# Patient Record
Sex: FEMALE | Race: WHITE | NOT HISPANIC OR LATINO
[De-identification: names, ages, dates, MRNs, and addresses within clinical notes are randomized per-mention and may not be internally consistent; named-entity substitution may affect disease eponyms.]

---

## 2019-08-08 ENCOUNTER — RECORD ABSTRACTING (OUTPATIENT)
Age: 54
End: 2019-08-08

## 2019-08-08 DIAGNOSIS — M94.9 DISORDER OF BONE, UNSPECIFIED: ICD-10-CM

## 2019-08-08 DIAGNOSIS — J30.2 OTHER SEASONAL ALLERGIC RHINITIS: ICD-10-CM

## 2019-08-08 DIAGNOSIS — M50.90 CERVICAL DISC DISORDER, UNSPECIFIED, UNSPECIFIED CERVICAL REGION: ICD-10-CM

## 2019-08-08 DIAGNOSIS — M89.9 DISORDER OF BONE, UNSPECIFIED: ICD-10-CM

## 2019-08-08 DIAGNOSIS — Z87.42 PERSONAL HISTORY OF OTHER DISEASES OF THE FEMALE GENITAL TRACT: ICD-10-CM

## 2019-08-08 DIAGNOSIS — Z86.2 PERSONAL HISTORY OF DISEASES OF THE BLOOD AND BLOOD-FORMING ORGANS AND CERTAIN DISORDERS INVOLVING THE IMMUNE MECHANISM: ICD-10-CM

## 2019-08-08 DIAGNOSIS — J30.9 ALLERGIC RHINITIS, UNSPECIFIED: ICD-10-CM

## 2019-08-08 DIAGNOSIS — Z87.898 PERSONAL HISTORY OF OTHER SPECIFIED CONDITIONS: ICD-10-CM

## 2019-08-08 DIAGNOSIS — K21.9 GASTRO-ESOPHAGEAL REFLUX DISEASE W/OUT ESOPHAGITIS: ICD-10-CM

## 2019-08-08 DIAGNOSIS — Z86.73 PERSONAL HISTORY OF TRANSIENT ISCHEMIC ATTACK (TIA), AND CEREBRAL INFARCTION W/OUT RESIDUAL DEFICITS: ICD-10-CM

## 2019-08-08 DIAGNOSIS — M19.90 UNSPECIFIED OSTEOARTHRITIS, UNSPECIFIED SITE: ICD-10-CM

## 2019-08-08 DIAGNOSIS — I10 ESSENTIAL (PRIMARY) HYPERTENSION: ICD-10-CM

## 2019-08-08 DIAGNOSIS — Z80.9 FAMILY HISTORY OF MALIGNANT NEOPLASM, UNSPECIFIED: ICD-10-CM

## 2019-08-08 DIAGNOSIS — E73.9 LACTOSE INTOLERANCE, UNSPECIFIED: ICD-10-CM

## 2019-08-08 DIAGNOSIS — D68.9 COAGULATION DEFECT, UNSPECIFIED: ICD-10-CM

## 2019-08-08 DIAGNOSIS — Z83.438 FAMILY HISTORY OF OTHER DISORDER OF LIPOPROTEIN METABOLISM AND OTHER LIPIDEMIA: ICD-10-CM

## 2019-08-08 DIAGNOSIS — E78.5 HYPERLIPIDEMIA, UNSPECIFIED: ICD-10-CM

## 2019-08-08 DIAGNOSIS — G40.909 EPILEPSY, UNSPECIFIED, NOT INTRACTABLE, W/OUT STATUS EPILEPTICUS: ICD-10-CM

## 2019-08-08 DIAGNOSIS — Z83.3 FAMILY HISTORY OF DIABETES MELLITUS: ICD-10-CM

## 2019-08-08 DIAGNOSIS — N99.89 OTHER POSTPROCEDURAL COMPLICATIONS AND DISORDERS OF GENITOURINARY SYSTEM: ICD-10-CM

## 2019-08-08 DIAGNOSIS — J45.909 UNSPECIFIED ASTHMA, UNCOMPLICATED: ICD-10-CM

## 2019-08-08 DIAGNOSIS — Z83.49 FAMILY HISTORY OF OTHER ENDOCRINE, NUTRITIONAL AND METABOLIC DISEASES: ICD-10-CM

## 2019-08-08 DIAGNOSIS — I69.90 UNSPECIFIED SEQUELAE OF UNSPECIFIED CEREBROVASCULAR DISEASE: ICD-10-CM

## 2019-08-08 DIAGNOSIS — Z80.0 FAMILY HISTORY OF MALIGNANT NEOPLASM OF DIGESTIVE ORGANS: ICD-10-CM

## 2019-08-08 DIAGNOSIS — Z82.49 FAMILY HISTORY OF ISCHEMIC HEART DISEASE AND OTHER DISEASES OF THE CIRCULATORY SYSTEM: ICD-10-CM

## 2019-08-08 PROBLEM — Z00.00 ENCOUNTER FOR PREVENTIVE HEALTH EXAMINATION: Status: ACTIVE | Noted: 2019-08-08

## 2019-08-08 LAB — CYTOLOGY CVX/VAG DOC THIN PREP: NORMAL

## 2019-08-08 RX ORDER — GABAPENTIN 100 MG/1
CAPSULE ORAL
Refills: 0 | Status: ACTIVE | COMMUNITY

## 2019-08-08 RX ORDER — CARBAMAZEPINE 300 MG/1
300 CAPSULE, EXTENDED RELEASE ORAL
Refills: 0 | Status: ACTIVE | COMMUNITY

## 2019-08-08 RX ORDER — ATORVASTATIN CALCIUM 40 MG/1
40 TABLET, FILM COATED ORAL
Refills: 0 | Status: ACTIVE | COMMUNITY

## 2019-08-08 RX ORDER — CETIRIZINE HYDROCHLORIDE 10 MG/1
10 TABLET, FILM COATED ORAL
Refills: 0 | Status: ACTIVE | COMMUNITY

## 2019-08-08 RX ORDER — ACETAMINOPHEN AND CODEINE PHOSPHATE 300; 30 MG/1; MG/1
300-30 TABLET ORAL
Refills: 0 | Status: ACTIVE | COMMUNITY

## 2019-08-08 RX ORDER — KETOCONAZOLE 20.5 MG/ML
2 SHAMPOO, SUSPENSION TOPICAL
Refills: 0 | Status: ACTIVE | COMMUNITY

## 2019-08-08 RX ORDER — LEVETIRACETAM 1000 MG/1
1000 TABLET, FILM COATED ORAL
Refills: 0 | Status: ACTIVE | COMMUNITY

## 2019-08-13 ENCOUNTER — APPOINTMENT (OUTPATIENT)
Dept: PAIN MANAGEMENT | Facility: CLINIC | Age: 54
End: 2019-08-13
Payer: MEDICARE

## 2019-08-13 VITALS
DIASTOLIC BLOOD PRESSURE: 70 MMHG | SYSTOLIC BLOOD PRESSURE: 100 MMHG | BODY MASS INDEX: 23.34 KG/M2 | WEIGHT: 163 LBS | HEIGHT: 70 IN

## 2019-08-13 PROCEDURE — 99214 OFFICE O/P EST MOD 30 MIN: CPT

## 2019-08-13 RX ORDER — WARFARIN 6 MG/1
6 TABLET ORAL
Refills: 0 | Status: ACTIVE | COMMUNITY

## 2019-08-13 NOTE — PHYSICAL EXAM
[Normal] : Well developed, in no acute distress, alert and oriented to person, place and time [Facet Tenderness] : facet tenderness [Spine: Flexion to ___ degrees, without pain] : spine: flexion to [unfilled] degrees, without pain [Wheelchair] : uses a wheelchair

## 2019-08-13 NOTE — HISTORY OF PRESENT ILLNESS
[FreeTextEntry1] : Interval Note:\par \par Patient was s/p L1 and L2 kyphoplasty for vertebral compression fracture and lumbar mbb/rfa with significant improvement in back pain.  She was doing well until 4 months ago she was coughing and felt intense back pain.  Since then she has had significant axial back pain, worse with transition, inabiliity to get out of her chair.  She was found to have vertebral compression fracture at L3 - now chronic without edema.  Continues to have significant back pain  Denies any additional weakness, numbness, bowel/bladder dysfunction.  Pain intensity is 10/10.  Improved with tylenol 3.\par \par \par HPI\par \par Ms. NORMA DOLAN is a 54 year F with pmhx of with history of iodinated contrast allergy with nausea and vomiting (no anaphylaxis), antiphospholipid syndrome, cva 31 years ago with left sided facial droop, left UE and LE weakness on coumadin for >5 years managed by Dr. Suh, hypertension, seizure disorder who sustained a fall in the beginning of 2/2017 and admitted to [facility] for evaluation and treatment of PNA s/p abx therapy upon discharge at [facility] in early 2/2017. As per patient she was at [facility] when she fell from her walker about 1 week ago and felt her back twist. Admitted at [facility] 2/19/17 for lowre back pain found o have acute L1 and L2 compression fracture. Patient is s/p L1 L2 kyphoplasty by me 2/20/17. Discharged to [facility] subacute rehab where patient is currently. Lower back pain has significantly improved since kyphoplasty. Patient is able to participate in PT. Denies any additional weakness, numbness or fever/chills. Incision is well healed, nonerythematous. Patient does complain of axial/mechanical bilateral lower back pain for many years without any inciting event. Nonradiating, described as aching. Patient takes tramadol 50mg BID for this pain but demonstrates desire to wean secondary to history of seizures.\par        Patient presents with  bilateral lower back pain. The pain has been occuring for many years. The pain frequency is constant with exacerbations. The character of the pain is described as aching. The current pain intensity is 7/10. With activity, the pain intensity increases to  10/10. The pain increases with activity. The pain is decreased by rest, medications. Patient reports that perfoming activities of daily living difficult. \par    History:  \par        Previous nerve block or injection \par         B/L L3-sacral ryan mbb RFA by me 5/16/17 with 100% relief of lower back pain\par         \par         Bilateral L3-sacral ryan mbb by me 3/16/17 with 90% relief of back pain for 15 hours\par         \par         Bilateral L3-sacral ryan mbb by me 4/13/17 with 50% relief of back pain for 8 hours\par         \par         L1 L2 kyphoplasty by me 2/20/17\par         . Imaging studies  \par \par MRI LS 7/2019\par \par There is a mild levoscoliosis of the lower lumbar spine. A levoscoliosis of the visualized thoracic  \par spine is noted. The lumbar lordosis and alignment is grossly maintained. The marrow signal is   \par overall within normal limits with no focal marrow replacing lesion identified.  \par  \par  There has been interval kyphoplasty of previously seen L1 and L2 compression fractures. There is   \par mild loss of height of the vertebral bodies with no significant retropulsion or underlying central   \par canal stenosis. There is additionally a mild chronic compression fracture of the superior endplate   \par which has developed in the interval. There is no associated vertebral edema. No acute fracture is   \par identified. There is mild degenerative endplate edema at L5-S1. Otherwise no abnormal vertebral or   \par paraspinal edema is appreciated. The visualized paraspinal soft tissues appear grossly unremarkable.  \par  \par  The conus medullaris terminates at L2 and the thecal sac terminates at S2. No abnormal signal is   \par identified in the visualized spinal cord.  \par  \par  L1-2: Mild disc bulge and mild ligamentous hypertrophy. No significant central canal, subarticular,  \par or foraminal stenosis. No significant interval change.  \par  \par  L2-3: Mild disc bulge and mild ligamentous hypertrophy. No significant central canal, subarticular,  \par or foraminal stenosis. Mild increase in disc bulging associated with the compression of superior   \par endplate of L3. No significant increase in stenosis.  \par  \par  L3-4: Mild disc bulge. Mild facet/ligamentous hypertrophy. No significant central canal or   \par subarticular stenosis. Mild/moderate left foraminal stenosis and no significant right foraminal   \par stenosis. No significant interval change.  \par  \par  L4-5: Minimal disc bulge and mild facet/ligamentous hypertrophy. No significant central canal or   \par subarticular stenosis. Mild bilateral foraminal stenosis. Previously seen central disc protrusion is  \par no longer appreciated. Otherwise no significant interval change.  \par  \par  L5-S1: Small left paracentral disc extrusion with inferiorly oriented extension superimposed on a   \par mild disc bulge. No significant central canal stenosis. Moderate left subarticular stenosis with   \par encroachment upon the left S1 nerve root. No significant right subarticular stenosis. Moderate right  \par foraminal stenosis and no significant left foraminal stenosis. No significant interval change.  \par  \par  \par  \par  IMPRESSION:  \par  \par  No acute fracture. Chronic compression fractures of L1, L2, and L3, with kyphoplasty cement in L1   \par and L2. Chronic L3 compression fracture has developed since 2/20/2017.  \par  \par  Lumbar spondylosis. Left paracentral disc herniation at L5-S1 encroaches upon the left S1 nerve   \par root. No significant central canal stenosis. Foraminal stenosis as above, most pronounced at L5-S1   \par the right. Overall no significant change in stenosis when compared to 2/20/2017.  \par \par         MRI LS 2/20/17\par \par There is a moderate dextroscoliosis of the lower thoracic and upper lumbar spine with apex at the approximate L1-2 level. The normal lumbar lordosis and alignment is maintained. The marrow signal is overall within normal limits with no focal marrow replacing lesion identified.\par         There is a mild compression deformity of the superior endplate of L1 with no significant retropulsion or underlying central canal stenosis. There is a mild compression deformity of the superior endplate of L2 with minimal retropulsion and no significant underlying central canal stenosis. There is edema associated with both these fractures consistent with acute to subacute fractures. In the visualized sacrum, there is edema at the S2 and S3 vertebral bodies which is nonspecific, possibly reflecting an additional sacral fracture. No definite cortical break was seen in this region on recent CT of the lumbar spine.\par         The visualized paraspinal soft tissues appear grossly unremarkable. The conus medullaris terminates at L2 and the thecal sac terminates at S1-2. No abnormal signal is identified in the visualized spinal cord.\par         T12-L1: There is no significant disc bulge or herniation. There is bilateral facet hypertrophy. There is no significant central canal, subarticular, or foraminal stenosis.\par         L1-2: There is minimal retropulsion of the superior endplate of L2. There is minimal disc bulging. There is moderate bilateral facet/ligamentous hypertrophy. There is no significant central canal, subarticular, or foraminal zone stenosis.\par         L2-3: There is no significant disc bulge or herniation. There is moderate bilateral facet/ligamentous hypertrophy. There is no significant central canal, subarticular, or foraminal zone stenosis.\par         L3-4: There is a moderate diffuse disc bulge. There is moderate bilateral facet/ligamentous hypertrophy. There is no significant central canal or subarticular zone stenosis. There is mild left foraminal stenosis and no significant right foraminal stenosis.\par         L4-5: There is a small central disc protrusion. There is moderate bilateral facet/ligamentous hypertrophy. There is no significant central canal stenosis. There is mild bilateral subarticular zone stenosis. There is mild bilateral foraminal stenosis.\par         L5-S1: There is a left paracentral disc extrusion with mild inferiorly oriented subligamentous extension superimposed on mild diffuse disc bulge. There is moderate bilateral facet hypertrophy. There is no significant central canal stenosis. There is moderate/severe left subarticular zone stenosis with encroachment upon the left S1 nerve root. There is no significant right subarticular zone stenosis. There is moderate right foraminal stenosis and no significant left foraminal stenosis.\par \par IMPRESSION:\par         Acute/subacute compression fracture of the superior endplate of L1 with no significant retropulsion or central canal stenosis. Acute/subacute compression fracture of the superior endplate of L2 with minimal retropulsion and no significant underlying central canal stenosis. Edema in the partially imaged S2 on S3 vertebra suggest an additional sacral fracture.\par         At L5-S1, a left paracentral disc herniation encroaches upon the left S1 nerve root.\par         Lumbar scoliosis and spondylosis. No significant central canal stenosis. Foraminal stenosis most pronounced at L5-S1 on the right.\par         \par \par

## 2019-09-05 ENCOUNTER — RESULT REVIEW (OUTPATIENT)
Age: 54
End: 2019-09-05

## 2019-09-05 ENCOUNTER — APPOINTMENT (OUTPATIENT)
Dept: PAIN MANAGEMENT | Facility: HOSPITAL | Age: 54
End: 2019-09-05

## 2019-09-20 ENCOUNTER — APPOINTMENT (OUTPATIENT)
Dept: PAIN MANAGEMENT | Facility: CLINIC | Age: 54
End: 2019-09-20
Payer: MEDICARE

## 2019-09-20 VITALS
HEIGHT: 70 IN | SYSTOLIC BLOOD PRESSURE: 122 MMHG | WEIGHT: 163 LBS | DIASTOLIC BLOOD PRESSURE: 60 MMHG | BODY MASS INDEX: 23.34 KG/M2

## 2019-09-20 PROCEDURE — 99214 OFFICE O/P EST MOD 30 MIN: CPT

## 2019-09-20 RX ORDER — CYANOCOBALAMIN (VITAMIN B-12) 1000 MCG
TABLET ORAL
Refills: 0 | Status: ACTIVE | COMMUNITY

## 2019-09-20 RX ORDER — GABAPENTIN 100 MG/1
100 CAPSULE ORAL
Qty: 30 | Refills: 0 | Status: ACTIVE | OUTPATIENT
Start: 2019-09-20

## 2019-09-20 NOTE — HISTORY OF PRESENT ILLNESS
[3] : 1. What number best describes your pain on average in the past week? 3/10 pain [2] : 3. What number best describes how, during the past week, pain has interfered with your general activity? 2/10 pain [FreeTextEntry1] : Interval Note:\par s/p BILATERAL L3-sacral ala diagnostic medial branch block 9/5/19 with significant improvement in bilateral lower back pain.  patient states that she is doing much better.  Mild pain in upper lumbar spine, but improved.  \par  Denies any additional weakness, numbness, bowel/bladder dysfunction.  \par \par \par HPI\par \par Ms. NORMA DOLAN is a 54 year F with pmhx of with history of iodinated contrast allergy with nausea and vomiting (no anaphylaxis), antiphospholipid syndrome, cva 31 years ago with left sided facial droop, left UE and LE weakness on coumadin for >5 years managed by Dr. Suh, hypertension, seizure disorder who sustained a fall in the beginning of 2/2017 and admitted to [facility] for evaluation and treatment of PNA s/p abx therapy upon discharge at [facility] in early 2/2017. As per patient she was at [facility] when she fell from her walker about 1 week ago and felt her back twist. Admitted at [facility] 2/19/17 for lowre back pain found o have acute L1 and L2 compression fracture. Patient is s/p L1 L2 kyphoplasty by me 2/20/17. Discharged to [facility] subacute rehab where patient is currently. Lower back pain has significantly improved since kyphoplasty. Patient is able to participate in PT. Denies any additional weakness, numbness or fever/chills. Incision is well healed, nonerythematous. Patient does complain of axial/mechanical bilateral lower back pain for many years without any inciting event. Nonradiating, described as aching. Patient takes tramadol 50mg BID for this pain but demonstrates desire to wean secondary to history of seizures.\par        Patient presents with  bilateral lower back pain. The pain has been occuring for many years. The pain frequency is constant with exacerbations. The character of the pain is described as aching. The current pain intensity is 7/10. With activity, the pain intensity increases to  10/10. The pain increases with activity. The pain is decreased by rest, medications. Patient reports that perfoming activities of daily living difficult. \par    History:  \par        Previous nerve block or injection \par \par BILATERAL L3-sacral ala diagnostic medial branch block 9/5/19\par         B/L L3-sacral ryan mbb RFA by me 5/16/17 with 100% relief of lower back pain\par         \par         Bilateral L3-sacral ryan mbb by me 3/16/17 with 90% relief of back pain for 15 hours\par         \par         Bilateral L3-sacral ryan mbb by me 4/13/17 with 50% relief of back pain for 8 hours\par         \par         L1 L2 kyphoplasty by me 2/20/17\par         . Imaging studies  \par \par MRI LS 7/2019\par \par There is a mild levoscoliosis of the lower lumbar spine. A levoscoliosis of the visualized thoracic  \par spine is noted. The lumbar lordosis and alignment is grossly maintained. The marrow signal is   \par overall within normal limits with no focal marrow replacing lesion identified.  \par  \par  There has been interval kyphoplasty of previously seen L1 and L2 compression fractures. There is   \par mild loss of height of the vertebral bodies with no significant retropulsion or underlying central   \par canal stenosis. There is additionally a mild chronic compression fracture of the superior endplate   \par which has developed in the interval. There is no associated vertebral edema. No acute fracture is   \par identified. There is mild degenerative endplate edema at L5-S1. Otherwise no abnormal vertebral or   \par paraspinal edema is appreciated. The visualized paraspinal soft tissues appear grossly unremarkable.  \par  \par  The conus medullaris terminates at L2 and the thecal sac terminates at S2. No abnormal signal is   \par identified in the visualized spinal cord.  \par  \par  L1-2: Mild disc bulge and mild ligamentous hypertrophy. No significant central canal, subarticular,  \par or foraminal stenosis. No significant interval change.  \par  \par  L2-3: Mild disc bulge and mild ligamentous hypertrophy. No significant central canal, subarticular,  \par or foraminal stenosis. Mild increase in disc bulging associated with the compression of superior   \par endplate of L3. No significant increase in stenosis.  \par  \par  L3-4: Mild disc bulge. Mild facet/ligamentous hypertrophy. No significant central canal or   \par subarticular stenosis. Mild/moderate left foraminal stenosis and no significant right foraminal   \par stenosis. No significant interval change.  \par  \par  L4-5: Minimal disc bulge and mild facet/ligamentous hypertrophy. No significant central canal or   \par subarticular stenosis. Mild bilateral foraminal stenosis. Previously seen central disc protrusion is  \par no longer appreciated. Otherwise no significant interval change.  \par  \par  L5-S1: Small left paracentral disc extrusion with inferiorly oriented extension superimposed on a   \par mild disc bulge. No significant central canal stenosis. Moderate left subarticular stenosis with   \par encroachment upon the left S1 nerve root. No significant right subarticular stenosis. Moderate right  \par foraminal stenosis and no significant left foraminal stenosis. No significant interval change.  \par  \par  \par  \par  IMPRESSION:  \par  \par  No acute fracture. Chronic compression fractures of L1, L2, and L3, with kyphoplasty cement in L1   \par and L2. Chronic L3 compression fracture has developed since 2/20/2017.  \par  \par  Lumbar spondylosis. Left paracentral disc herniation at L5-S1 encroaches upon the left S1 nerve   \par root. No significant central canal stenosis. Foraminal stenosis as above, most pronounced at L5-S1   \par the right. Overall no significant change in stenosis when compared to 2/20/2017.  \par \par         MRI LS 2/20/17\par \par There is a moderate dextroscoliosis of the lower thoracic and upper lumbar spine with apex at the approximate L1-2 level. The normal lumbar lordosis and alignment is maintained. The marrow signal is overall within normal limits with no focal marrow replacing lesion identified.\par         There is a mild compression deformity of the superior endplate of L1 with no significant retropulsion or underlying central canal stenosis. There is a mild compression deformity of the superior endplate of L2 with minimal retropulsion and no significant underlying central canal stenosis. There is edema associated with both these fractures consistent with acute to subacute fractures. In the visualized sacrum, there is edema at the S2 and S3 vertebral bodies which is nonspecific, possibly reflecting an additional sacral fracture. No definite cortical break was seen in this region on recent CT of the lumbar spine.\par         The visualized paraspinal soft tissues appear grossly unremarkable. The conus medullaris terminates at L2 and the thecal sac terminates at S1-2. No abnormal signal is identified in the visualized spinal cord.\par         T12-L1: There is no significant disc bulge or herniation. There is bilateral facet hypertrophy. There is no significant central canal, subarticular, or foraminal stenosis.\par         L1-2: There is minimal retropulsion of the superior endplate of L2. There is minimal disc bulging. There is moderate bilateral facet/ligamentous hypertrophy. There is no significant central canal, subarticular, or foraminal zone stenosis.\par         L2-3: There is no significant disc bulge or herniation. There is moderate bilateral facet/ligamentous hypertrophy. There is no significant central canal, subarticular, or foraminal zone stenosis.\par         L3-4: There is a moderate diffuse disc bulge. There is moderate bilateral facet/ligamentous hypertrophy. There is no significant central canal or subarticular zone stenosis. There is mild left foraminal stenosis and no significant right foraminal stenosis.\par         L4-5: There is a small central disc protrusion. There is moderate bilateral facet/ligamentous hypertrophy. There is no significant central canal stenosis. There is mild bilateral subarticular zone stenosis. There is mild bilateral foraminal stenosis.\par         L5-S1: There is a left paracentral disc extrusion with mild inferiorly oriented subligamentous extension superimposed on mild diffuse disc bulge. There is moderate bilateral facet hypertrophy. There is no significant central canal stenosis. There is moderate/severe left subarticular zone stenosis with encroachment upon the left S1 nerve root. There is no significant right subarticular zone stenosis. There is moderate right foraminal stenosis and no significant left foraminal stenosis.\par \par IMPRESSION:\par         Acute/subacute compression fracture of the superior endplate of L1 with no significant retropulsion or central canal stenosis. Acute/subacute compression fracture of the superior endplate of L2 with minimal retropulsion and no significant underlying central canal stenosis. Edema in the partially imaged S2 on S3 vertebra suggest an additional sacral fracture.\par         At L5-S1, a left paracentral disc herniation encroaches upon the left S1 nerve root.\par         Lumbar scoliosis and spondylosis. No significant central canal stenosis. Foraminal stenosis most pronounced at L5-S1 on the right.\par         \par \par  [FreeTextEntry2] : 7

## 2019-09-20 NOTE — ASSESSMENT
[FreeTextEntry1] : Significant component of axial back pain secondary to lumbar spondylosis and facet arthropathy demonstrated on MRI LS.  Pain refractory to conservative treatments.  s/p BILATERAL L3-sacral ala diagnostic medial branch block with significant improvement\par \par may consider rfa\par \par patient will consult with prescriber regarding holding coumadin 5 days prior to procedure, check INR day of procedure\par \par Compression fracture of lumbar vertebra, non-traumatic, sequela \par compression fracture of L1 and L2 s/p kyphoplasty with significant improvement in pain and function. \par now with chronic L3 vcf \par \par start PT - referral provided\par \par gabapentin 100mg nightly\par  \par The above diagnosis and treatment plan is medically reasonable and necessary based on the patient encounter.\par \par There were no barriers to communication.\par Informed patient that I would be available for any additional questions.\par \par Regarding opiate medication to manage pain. I had a detailed discussion with the patient regarding the risks of long-term opioid use, including the potential for medication side effects, hyperaglesia, endocrine dysfunction, addiction, tolerance, constipation, among others. Discussed relevant risks. \par  recommended discontinuation of coumadin\par \par Instructed patient to maintain pain diary to monitor pain level, mobility, and function.\par \par \par \par \par

## 2019-09-20 NOTE — PHYSICAL EXAM
[Spine: Flexion to ___ degrees, without pain] : spine: flexion to [unfilled] degrees, without pain [Wheelchair] : uses a wheelchair [Normal muscle bulk without asymmetry] : normal muscle bulk without asymmetry [UE/LE] : Motor: 5/5 motor strength in bilateral upper & lower extremities [Facet Tenderness] : no facet tenderness [Normal] : Normal affect

## 2019-10-18 ENCOUNTER — APPOINTMENT (OUTPATIENT)
Dept: PAIN MANAGEMENT | Facility: CLINIC | Age: 54
End: 2019-10-18
Payer: MEDICARE

## 2019-10-18 VITALS
HEIGHT: 70 IN | DIASTOLIC BLOOD PRESSURE: 70 MMHG | BODY MASS INDEX: 23.62 KG/M2 | SYSTOLIC BLOOD PRESSURE: 111 MMHG | WEIGHT: 165 LBS

## 2019-10-18 PROCEDURE — 99214 OFFICE O/P EST MOD 30 MIN: CPT

## 2019-10-18 NOTE — ASSESSMENT
[FreeTextEntry1] : Significant component of axial back pain secondary to lumbar spondylosis and facet arthropathy demonstrated on MRI LS.  Pain refractory to conservative treatments.  s/p BILATERAL L3-sacral ala diagnostic medial branch block with significant improvement\par \par Given significant relief from diagnostic lumbar medial branch block of >80%, and continued lumbar facet arthropathy pain and axial back pain, will schedule LEFT AND RIGHT  L3-sacral ala medial branch radiofrequency ablation at 80C for 2:30 min r/b/a discussed\par \par patient will consult with prescriber regarding holding coumadin 5 days prior to procedure, check INR day of procedure\par \par Compression fracture of lumbar vertebra, non-traumatic, sequela \par compression fracture of L1 and L2 s/p kyphoplasty with significant improvement in pain and function. \par now with chronic L3 vcf \par \par continue pT\par \par gabapentin 100mg nightly\par  \par The above diagnosis and treatment plan is medically reasonable and necessary based on the patient encounter.\par \par There were no barriers to communication.\par Informed patient that I would be available for any additional questions.\par \par Regarding opiate medication to manage pain. I had a detailed discussion with the patient regarding the risks of long-term opioid use, including the potential for medication side effects, hyperaglesia, endocrine dysfunction, addiction, tolerance, constipation, among others. Discussed relevant risks. \par  recommended discontinuation of coumadin\par \par Instructed patient to maintain pain diary to monitor pain level, mobility, and function.\par \par \par \par \par

## 2019-10-18 NOTE — HISTORY OF PRESENT ILLNESS
[3] : 1. What number best describes your pain on average in the past week? 3/10 pain [2] : 3. What number best describes how, during the past week, pain has interfered with your general activity? 2/10 pain [FreeTextEntry1] : Interval Note:\par s/p BILATERAL L3-sacral ala diagnostic medial branch block 9/5/19 with significant improvement in bilateral lower back pain.  Initially much improved with Pt but now having worsening pain over the lower back.  Pain is axial and worse with transitions.\par  Denies any additional weakness, numbness, bowel/bladder dysfunction.  \par \par \par HPI\par \par Ms. NORMA DOLAN is a 54 year F with pmhx of with history of iodinated contrast allergy with nausea and vomiting (no anaphylaxis), antiphospholipid syndrome, cva 31 years ago with left sided facial droop, left UE and LE weakness on coumadin for >5 years managed by Dr. Suh, hypertension, seizure disorder who sustained a fall in the beginning of 2/2017 and admitted to [facility] for evaluation and treatment of PNA s/p abx therapy upon discharge at [facility] in early 2/2017. As per patient she was at [place] [facility] when she fell from her walker about 1 week ago and felt her back twist. Admitted at [facility] 2/19/17 for lowre back pain found o have acute L1 and L2 compression fracture. Patient is s/p L1 L2 kyphoplasty by me 2/20/17. Discharged to [facility] subacute rehab where patient is currently. Lower back pain has significantly improved since kyphoplasty. Patient is able to participate in PT. Denies any additional weakness, numbness or fever/chills. Incision is well healed, nonerythematous. Patient does complain of axial/mechanical bilateral lower back pain for many years without any inciting event. Nonradiating, described as aching. Patient takes tramadol 50mg BID for this pain but demonstrates desire to wean secondary to history of seizures.\par        Patient presents with  bilateral lower back pain. The pain has been occuring for many years. The pain frequency is constant with exacerbations. The character of the pain is described as aching. The current pain intensity is 7/10. With activity, the pain intensity increases to  10/10. The pain increases with activity. The pain is decreased by rest, medications. Patient reports that perfoming activities of daily living difficult. \par    History:  \par        Previous nerve block or injection \par \par BILATERAL L3-sacral ala diagnostic medial branch block 9/5/19\par         B/L L3-sacral ryan mbb RFA by me 5/16/17 with 100% relief of lower back pain\par         \par         Bilateral L3-sacral ryan mbb by me 3/16/17 with 90% relief of back pain for 15 hours\par         \par         Bilateral L3-sacral ryan mbb by me 4/13/17 with 50% relief of back pain for 8 hours\par         \par         L1 L2 kyphoplasty by me 2/20/17\par         . Imaging studies  \par \par MRI LS 7/2019\par \par There is a mild levoscoliosis of the lower lumbar spine. A levoscoliosis of the visualized thoracic  \par spine is noted. The lumbar lordosis and alignment is grossly maintained. The marrow signal is   \par overall within normal limits with no focal marrow replacing lesion identified.  \par  \par  There has been interval kyphoplasty of previously seen L1 and L2 compression fractures. There is   \par mild loss of height of the vertebral bodies with no significant retropulsion or underlying central   \par canal stenosis. There is additionally a mild chronic compression fracture of the superior endplate   \par which has developed in the interval. There is no associated vertebral edema. No acute fracture is   \par identified. There is mild degenerative endplate edema at L5-S1. Otherwise no abnormal vertebral or   \par paraspinal edema is appreciated. The visualized paraspinal soft tissues appear grossly unremarkable.  \par  \par  The conus medullaris terminates at L2 and the thecal sac terminates at S2. No abnormal signal is   \par identified in the visualized spinal cord.  \par  \par  L1-2: Mild disc bulge and mild ligamentous hypertrophy. No significant central canal, subarticular,  \par or foraminal stenosis. No significant interval change.  \par  \par  L2-3: Mild disc bulge and mild ligamentous hypertrophy. No significant central canal, subarticular,  \par or foraminal stenosis. Mild increase in disc bulging associated with the compression of superior   \par endplate of L3. No significant increase in stenosis.  \par  \par  L3-4: Mild disc bulge. Mild facet/ligamentous hypertrophy. No significant central canal or   \par subarticular stenosis. Mild/moderate left foraminal stenosis and no significant right foraminal   \par stenosis. No significant interval change.  \par  \par  L4-5: Minimal disc bulge and mild facet/ligamentous hypertrophy. No significant central canal or   \par subarticular stenosis. Mild bilateral foraminal stenosis. Previously seen central disc protrusion is  \par no longer appreciated. Otherwise no significant interval change.  \par  \par  L5-S1: Small left paracentral disc extrusion with inferiorly oriented extension superimposed on a   \par mild disc bulge. No significant central canal stenosis. Moderate left subarticular stenosis with   \par encroachment upon the left S1 nerve root. No significant right subarticular stenosis. Moderate right  \par foraminal stenosis and no significant left foraminal stenosis. No significant interval change.  \par  \par  \par  \par  IMPRESSION:  \par  \par  No acute fracture. Chronic compression fractures of L1, L2, and L3, with kyphoplasty cement in L1   \par and L2. Chronic L3 compression fracture has developed since 2/20/2017.  \par  \par  Lumbar spondylosis. Left paracentral disc herniation at L5-S1 encroaches upon the left S1 nerve   \par root. No significant central canal stenosis. Foraminal stenosis as above, most pronounced at L5-S1   \par the right. Overall no significant change in stenosis when compared to 2/20/2017.  \par \par         MRI LS 2/20/17\par \par There is a moderate dextroscoliosis of the lower thoracic and upper lumbar spine with apex at the approximate L1-2 level. The normal lumbar lordosis and alignment is maintained. The marrow signal is overall within normal limits with no focal marrow replacing lesion identified.\par         There is a mild compression deformity of the superior endplate of L1 with no significant retropulsion or underlying central canal stenosis. There is a mild compression deformity of the superior endplate of L2 with minimal retropulsion and no significant underlying central canal stenosis. There is edema associated with both these fractures consistent with acute to subacute fractures. In the visualized sacrum, there is edema at the S2 and S3 vertebral bodies which is nonspecific, possibly reflecting an additional sacral fracture. No definite cortical break was seen in this region on recent CT of the lumbar spine.\par         The visualized paraspinal soft tissues appear grossly unremarkable. The conus medullaris terminates at L2 and the thecal sac terminates at S1-2. No abnormal signal is identified in the visualized spinal cord.\par         T12-L1: There is no significant disc bulge or herniation. There is bilateral facet hypertrophy. There is no significant central canal, subarticular, or foraminal stenosis.\par         L1-2: There is minimal retropulsion of the superior endplate of L2. There is minimal disc bulging. There is moderate bilateral facet/ligamentous hypertrophy. There is no significant central canal, subarticular, or foraminal zone stenosis.\par         L2-3: There is no significant disc bulge or herniation. There is moderate bilateral facet/ligamentous hypertrophy. There is no significant central canal, subarticular, or foraminal zone stenosis.\par         L3-4: There is a moderate diffuse disc bulge. There is moderate bilateral facet/ligamentous hypertrophy. There is no significant central canal or subarticular zone stenosis. There is mild left foraminal stenosis and no significant right foraminal stenosis.\par         L4-5: There is a small central disc protrusion. There is moderate bilateral facet/ligamentous hypertrophy. There is no significant central canal stenosis. There is mild bilateral subarticular zone stenosis. There is mild bilateral foraminal stenosis.\par         L5-S1: There is a left paracentral disc extrusion with mild inferiorly oriented subligamentous extension superimposed on mild diffuse disc bulge. There is moderate bilateral facet hypertrophy. There is no significant central canal stenosis. There is moderate/severe left subarticular zone stenosis with encroachment upon the left S1 nerve root. There is no significant right subarticular zone stenosis. There is moderate right foraminal stenosis and no significant left foraminal stenosis.\par \par IMPRESSION:\par         Acute/subacute compression fracture of the superior endplate of L1 with no significant retropulsion or central canal stenosis. Acute/subacute compression fracture of the superior endplate of L2 with minimal retropulsion and no significant underlying central canal stenosis. Edema in the partially imaged S2 on S3 vertebra suggest an additional sacral fracture.\par         At L5-S1, a left paracentral disc herniation encroaches upon the left S1 nerve root.\par         Lumbar scoliosis and spondylosis. No significant central canal stenosis. Foraminal stenosis most pronounced at L5-S1 on the right.\par         \par \par  [FreeTextEntry2] : 7

## 2019-10-18 NOTE — PHYSICAL EXAM
[Normal muscle bulk without asymmetry] : normal muscle bulk without asymmetry [Wheelchair] : uses a wheelchair [Spine: Flexion to ___ degrees, without pain] : spine: flexion to [unfilled] degrees, without pain [UE/LE] : Motor: 5/5 motor strength in bilateral upper & lower extremities [Normal] : Normal affect [Facet Tenderness] : facet tenderness

## 2019-11-20 ENCOUNTER — RESULT REVIEW (OUTPATIENT)
Age: 54
End: 2019-11-20

## 2019-11-20 ENCOUNTER — APPOINTMENT (OUTPATIENT)
Dept: PAIN MANAGEMENT | Facility: HOSPITAL | Age: 54
End: 2019-11-20

## 2019-12-06 ENCOUNTER — APPOINTMENT (OUTPATIENT)
Dept: PAIN MANAGEMENT | Facility: CLINIC | Age: 54
End: 2019-12-06
Payer: MEDICARE

## 2019-12-06 VITALS
DIASTOLIC BLOOD PRESSURE: 60 MMHG | BODY MASS INDEX: 23.62 KG/M2 | HEIGHT: 70 IN | SYSTOLIC BLOOD PRESSURE: 118 MMHG | WEIGHT: 165 LBS

## 2019-12-06 PROCEDURE — 99214 OFFICE O/P EST MOD 30 MIN: CPT

## 2019-12-06 NOTE — HISTORY OF PRESENT ILLNESS
[3] : 1. What number best describes your pain on average in the past week? 3/10 pain [2] : 2. What number best describes how, during the past week, pain has interfered with your enjoyment of life? 2/10 pain [FreeTextEntry1] : Interval Note:\par \par s/p LEFT AND RIGHT  L3-sacral ala medial branch radiofrequency ablation 11/20/19 with 90% improvement in back pain.  Patient complains of mild pain at night in lower back.  She utilize tylenol 3 infrequently, but pain much improved. \par  Denies any additional weakness, numbness, bowel/bladder dysfunction.  \par \par \par HPI\par \par Ms. NORMA DOLAN is a 54 year F with pmhx of with history of iodinated contrast allergy with nausea and vomiting (no anaphylaxis), antiphospholipid syndrome, cva 31 years ago with left sided facial droop, left UE and LE weakness on coumadin for >5 years managed by Dr. Suh, hypertension, seizure disorder who sustained a fall in the beginning of 2/2017 and admitted to [facility] for evaluation and treatment of PNA s/p abx therapy upon discharge at [facility] in early 2/2017. As per patient she was at [place] [facility] when she fell from her walker about 1 week ago and felt her back twist. Admitted at [facility] 2/19/17 for lowre back pain found o have acute L1 and L2 compression fracture. Patient is s/p L1 L2 kyphoplasty by me 2/20/17. Discharged to [facility] subacute rehab where patient is currently. Lower back pain has significantly improved since kyphoplasty. Patient is able to participate in PT. Denies any additional weakness, numbness or fever/chills. Incision is well healed, nonerythematous. Patient does complain of axial/mechanical bilateral lower back pain for many years without any inciting event. Nonradiating, described as aching. Patient takes tramadol 50mg BID for this pain but demonstrates desire to wean secondary to history of seizures.\par        Patient presents with  bilateral lower back pain. The pain has been occuring for many years. The pain frequency is constant with exacerbations. The character of the pain is described as aching. The current pain intensity is 7/10. With activity, the pain intensity increases to  10/10. The pain increases with activity. The pain is decreased by rest, medications. Patient reports that perfoming activities of daily living difficult. \par    History:  \par        Previous nerve block or injection \par \par LEFT AND RIGHT  L3-sacral ala medial branch radiofrequency ablation 10/20/19\par BILATERAL L3-sacral ala diagnostic medial branch block 9/5/19\par         B/L L3-sacral ryan mbb RFA by me 5/16/17 with 100% relief of lower back pain\par         \par         Bilateral L3-sacral ryan mbb by me 3/16/17 with 90% relief of back pain for 15 hours\par         \par         Bilateral L3-sacral ryan mbb by me 4/13/17 with 50% relief of back pain for 8 hours\par         \par         L1 L2 kyphoplasty by me 2/20/17\par         . Imaging studies  \par \par MRI LS 7/2019\par \par There is a mild levoscoliosis of the lower lumbar spine. A levoscoliosis of the visualized thoracic  \par spine is noted. The lumbar lordosis and alignment is grossly maintained. The marrow signal is   \par overall within normal limits with no focal marrow replacing lesion identified.  \par  \par  There has been interval kyphoplasty of previously seen L1 and L2 compression fractures. There is   \par mild loss of height of the vertebral bodies with no significant retropulsion or underlying central   \par canal stenosis. There is additionally a mild chronic compression fracture of the superior endplate   \par which has developed in the interval. There is no associated vertebral edema. No acute fracture is   \par identified. There is mild degenerative endplate edema at L5-S1. Otherwise no abnormal vertebral or   \par paraspinal edema is appreciated. The visualized paraspinal soft tissues appear grossly unremarkable.  \par  \par  The conus medullaris terminates at L2 and the thecal sac terminates at S2. No abnormal signal is   \par identified in the visualized spinal cord.  \par  \par  L1-2: Mild disc bulge and mild ligamentous hypertrophy. No significant central canal, subarticular,  \par or foraminal stenosis. No significant interval change.  \par  \par  L2-3: Mild disc bulge and mild ligamentous hypertrophy. No significant central canal, subarticular,  \par or foraminal stenosis. Mild increase in disc bulging associated with the compression of superior   \par endplate of L3. No significant increase in stenosis.  \par  \par  L3-4: Mild disc bulge. Mild facet/ligamentous hypertrophy. No significant central canal or   \par subarticular stenosis. Mild/moderate left foraminal stenosis and no significant right foraminal   \par stenosis. No significant interval change.  \par  \par  L4-5: Minimal disc bulge and mild facet/ligamentous hypertrophy. No significant central canal or   \par subarticular stenosis. Mild bilateral foraminal stenosis. Previously seen central disc protrusion is  \par no longer appreciated. Otherwise no significant interval change.  \par  \par  L5-S1: Small left paracentral disc extrusion with inferiorly oriented extension superimposed on a   \par mild disc bulge. No significant central canal stenosis. Moderate left subarticular stenosis with   \par encroachment upon the left S1 nerve root. No significant right subarticular stenosis. Moderate right  \par foraminal stenosis and no significant left foraminal stenosis. No significant interval change.  \par  \par  \par  \par  IMPRESSION:  \par  \par  No acute fracture. Chronic compression fractures of L1, L2, and L3, with kyphoplasty cement in L1   \par and L2. Chronic L3 compression fracture has developed since 2/20/2017.  \par  \par  Lumbar spondylosis. Left paracentral disc herniation at L5-S1 encroaches upon the left S1 nerve   \par root. No significant central canal stenosis. Foraminal stenosis as above, most pronounced at L5-S1   \par the right. Overall no significant change in stenosis when compared to 2/20/2017.  \par \par         MRI LS 2/20/17\par \par There is a moderate dextroscoliosis of the lower thoracic and upper lumbar spine with apex at the approximate L1-2 level. The normal lumbar lordosis and alignment is maintained. The marrow signal is overall within normal limits with no focal marrow replacing lesion identified.\par         There is a mild compression deformity of the superior endplate of L1 with no significant retropulsion or underlying central canal stenosis. There is a mild compression deformity of the superior endplate of L2 with minimal retropulsion and no significant underlying central canal stenosis. There is edema associated with both these fractures consistent with acute to subacute fractures. In the visualized sacrum, there is edema at the S2 and S3 vertebral bodies which is nonspecific, possibly reflecting an additional sacral fracture. No definite cortical break was seen in this region on recent CT of the lumbar spine.\par         The visualized paraspinal soft tissues appear grossly unremarkable. The conus medullaris terminates at L2 and the thecal sac terminates at S1-2. No abnormal signal is identified in the visualized spinal cord.\par         T12-L1: There is no significant disc bulge or herniation. There is bilateral facet hypertrophy. There is no significant central canal, subarticular, or foraminal stenosis.\par         L1-2: There is minimal retropulsion of the superior endplate of L2. There is minimal disc bulging. There is moderate bilateral facet/ligamentous hypertrophy. There is no significant central canal, subarticular, or foraminal zone stenosis.\par         L2-3: There is no significant disc bulge or herniation. There is moderate bilateral facet/ligamentous hypertrophy. There is no significant central canal, subarticular, or foraminal zone stenosis.\par         L3-4: There is a moderate diffuse disc bulge. There is moderate bilateral facet/ligamentous hypertrophy. There is no significant central canal or subarticular zone stenosis. There is mild left foraminal stenosis and no significant right foraminal stenosis.\par         L4-5: There is a small central disc protrusion. There is moderate bilateral facet/ligamentous hypertrophy. There is no significant central canal stenosis. There is mild bilateral subarticular zone stenosis. There is mild bilateral foraminal stenosis.\par         L5-S1: There is a left paracentral disc extrusion with mild inferiorly oriented subligamentous extension superimposed on mild diffuse disc bulge. There is moderate bilateral facet hypertrophy. There is no significant central canal stenosis. There is moderate/severe left subarticular zone stenosis with encroachment upon the left S1 nerve root. There is no significant right subarticular zone stenosis. There is moderate right foraminal stenosis and no significant left foraminal stenosis.\par \par IMPRESSION:\par         Acute/subacute compression fracture of the superior endplate of L1 with no significant retropulsion or central canal stenosis. Acute/subacute compression fracture of the superior endplate of L2 with minimal retropulsion and no significant underlying central canal stenosis. Edema in the partially imaged S2 on S3 vertebra suggest an additional sacral fracture.\par         At L5-S1, a left paracentral disc herniation encroaches upon the left S1 nerve root.\par         Lumbar scoliosis and spondylosis. No significant central canal stenosis. Foraminal stenosis most pronounced at L5-S1 on the right.\par         \par \par  [FreeTextEntry2] : 7

## 2019-12-06 NOTE — ASSESSMENT
[FreeTextEntry1] : Significant component of axial back pain secondary to lumbar spondylosis and facet arthropathy demonstrated on MRI LS.  Pain refractory to conservative treatments.  s/p BILATERAL L3-sacral ala diagnostic medial branch block with significant improvement\par \par Given significant relief from diagnostic lumbar medial branch block of >80%, and continued lumbar facet arthropathy pain and axial back pain, s/p LEFT AND RIGHT  L3-sacral ala medial branch radiofrequency ablation with improvement in axial back pain\par \par start PT - referral provided\par \par Compression fracture of lumbar vertebra, non-traumatic, sequela \par compression fracture of L1 and L2 s/p kyphoplasty with significant improvement in pain and function. \par now with chronic L3 vcf \par \par gabapentin 100mg nightly\par  \par The above diagnosis and treatment plan is medically reasonable and necessary based on the patient encounter.\par \par There were no barriers to communication.\par Informed patient that I would be available for any additional questions.\par \par Regarding opiate medication to manage pain. I had a detailed discussion with the patient regarding the risks of long-term opioid use, including the potential for medication side effects, hyperaglesia, endocrine dysfunction, addiction, tolerance, constipation, among others. Discussed relevant risks. \par  recommended discontinuation of coumadin\par \par Instructed patient to maintain pain diary to monitor pain level, mobility, and function.\par \par \par \par \par

## 2019-12-06 NOTE — PHYSICAL EXAM
[Normal muscle bulk without asymmetry] : normal muscle bulk without asymmetry [Spine: Flexion to ___ degrees, without pain] : spine: flexion to [unfilled] degrees, without pain [Wheelchair] : uses a wheelchair [UE/LE] : Motor: 5/5 motor strength in bilateral upper & lower extremities [Normal] : Normal affect [Facet Tenderness] : no facet tenderness

## 2020-01-10 ENCOUNTER — APPOINTMENT (OUTPATIENT)
Dept: PAIN MANAGEMENT | Facility: CLINIC | Age: 55
End: 2020-01-10
Payer: MEDICARE

## 2020-01-10 VITALS
SYSTOLIC BLOOD PRESSURE: 118 MMHG | HEIGHT: 70 IN | DIASTOLIC BLOOD PRESSURE: 70 MMHG | WEIGHT: 165 LBS | BODY MASS INDEX: 23.62 KG/M2

## 2020-01-10 PROCEDURE — 99214 OFFICE O/P EST MOD 30 MIN: CPT

## 2020-01-10 NOTE — PHYSICAL EXAM
[Normal muscle bulk without asymmetry] : normal muscle bulk without asymmetry [Wheelchair] : uses a wheelchair [Normal] : Normal affect [UE/LE] : Motor: 5/5 motor strength in bilateral upper & lower extremities [Spinous Process Tenderness] : no spinous process tenderness [Facet Tenderness] : no facet tenderness

## 2020-01-10 NOTE — ASSESSMENT
[FreeTextEntry1] : Significant component of axial back pain secondary to lumbar spondylosis and facet arthropathy demonstrated on MRI LS.  Pain refractory to conservative treatments.  s/p BILATERAL L3-sacral ala diagnostic medial branch block with significant improvement\par \par Given significant relief from diagnostic lumbar medial branch block of >80%, and continued lumbar facet arthropathy pain and axial back pain, s/p LEFT AND RIGHT  L3-sacral ala medial branch radiofrequency ablation with improvement in axial back pain\par \par continue PT\par \par Compression fracture of lumbar vertebra, non-traumatic, sequela \par compression fracture of L1 and L2 s/p kyphoplasty with significant improvement in pain and function. \par now with chronic L3 vcf \par \par referral to endocrinology for eval and treatment of osteoporosis\par gabapentin 100mg nightly\par  \par The above diagnosis and treatment plan is medically reasonable and necessary based on the patient encounter.\par \par There were no barriers to communication.\par Informed patient that I would be available for any additional questions.\par \par \par Instructed patient to maintain pain diary to monitor pain level, mobility, and function.\par \par \par \par \par

## 2020-01-10 NOTE — HISTORY OF PRESENT ILLNESS
[2] : 2. What number best describes how, during the past week, pain has interfered with your enjoyment of life? 2/10 pain [FreeTextEntry2] : 6 [FreeTextEntry1] : Interval Note:\par \par s/p LEFT AND RIGHT  L3-sacral ala medial branch radiofrequency ablation 11/20/19 with 90% improvement in back pain.  Patient complains of mild pain at night in lower back, improved with lidocaine patch.  Patient states that she has improvement in ROM as well and is very happy with her relief.\par  Denies any additional weakness, numbness, bowel/bladder dysfunction.  \par \par \par HPI\par \par Ms. NORMA DOLAN is a 54 year F with pmhx of with history of iodinated contrast allergy with nausea and vomiting (no anaphylaxis), antiphospholipid syndrome, cva 31 years ago with left sided facial droop, left UE and LE weakness on coumadin for >5 years managed by Dr. Suh, hypertension, seizure disorder who sustained a fall in the beginning of 2/2017 and admitted to [facility] for evaluation and treatment of PNA s/p abx therapy upon discharge at [facility] in early 2/2017. As per patient she was at [place] [facility] when she fell from her walker about 1 week ago and felt her back twist. Admitted at [facility] 2/19/17 for lowre back pain found o have acute L1 and L2 compression fracture. Patient is s/p L1 L2 kyphoplasty by me 2/20/17. Discharged to [facility] subacute rehab where patient is currently. Lower back pain has significantly improved since kyphoplasty. Patient is able to participate in PT. Denies any additional weakness, numbness or fever/chills. Incision is well healed, nonerythematous. Patient does complain of axial/mechanical bilateral lower back pain for many years without any inciting event. Nonradiating, described as aching. Patient takes tramadol 50mg BID for this pain but demonstrates desire to wean secondary to history of seizures.\par        Patient presents with  bilateral lower back pain. The pain has been occuring for many years. The pain frequency is constant with exacerbations. The character of the pain is described as aching. The current pain intensity is 7/10. With activity, the pain intensity increases to  10/10. The pain increases with activity. The pain is decreased by rest, medications. Patient reports that perfoming activities of daily living difficult. \par    History:  \par        Previous nerve block or injection \par \par LEFT AND RIGHT  L3-sacral ala medial branch radiofrequency ablation 10/20/19\par BILATERAL L3-sacral ala diagnostic medial branch block 9/5/19\par         B/L L3-sacral ryan mbb RFA by me 5/16/17 with 100% relief of lower back pain\par         \par         Bilateral L3-sacral ryan mbb by me 3/16/17 with 90% relief of back pain for 15 hours\par         \par         Bilateral L3-sacral ryan mbb by me 4/13/17 with 50% relief of back pain for 8 hours\par         \par         L1 L2 kyphoplasty by me 2/20/17\par         . Imaging studies  \par \par MRI LS 7/2019\par \par There is a mild levoscoliosis of the lower lumbar spine. A levoscoliosis of the visualized thoracic  \par spine is noted. The lumbar lordosis and alignment is grossly maintained. The marrow signal is   \par overall within normal limits with no focal marrow replacing lesion identified.  \par  \par  There has been interval kyphoplasty of previously seen L1 and L2 compression fractures. There is   \par mild loss of height of the vertebral bodies with no significant retropulsion or underlying central   \par canal stenosis. There is additionally a mild chronic compression fracture of the superior endplate   \par which has developed in the interval. There is no associated vertebral edema. No acute fracture is   \par identified. There is mild degenerative endplate edema at L5-S1. Otherwise no abnormal vertebral or   \par paraspinal edema is appreciated. The visualized paraspinal soft tissues appear grossly unremarkable.  \par  \par  The conus medullaris terminates at L2 and the thecal sac terminates at S2. No abnormal signal is   \par identified in the visualized spinal cord.  \par  \par  L1-2: Mild disc bulge and mild ligamentous hypertrophy. No significant central canal, subarticular,  \par or foraminal stenosis. No significant interval change.  \par  \par  L2-3: Mild disc bulge and mild ligamentous hypertrophy. No significant central canal, subarticular,  \par or foraminal stenosis. Mild increase in disc bulging associated with the compression of superior   \par endplate of L3. No significant increase in stenosis.  \par  \par  L3-4: Mild disc bulge. Mild facet/ligamentous hypertrophy. No significant central canal or   \par subarticular stenosis. Mild/moderate left foraminal stenosis and no significant right foraminal   \par stenosis. No significant interval change.  \par  \par  L4-5: Minimal disc bulge and mild facet/ligamentous hypertrophy. No significant central canal or   \par subarticular stenosis. Mild bilateral foraminal stenosis. Previously seen central disc protrusion is  \par no longer appreciated. Otherwise no significant interval change.  \par  \par  L5-S1: Small left paracentral disc extrusion with inferiorly oriented extension superimposed on a   \par mild disc bulge. No significant central canal stenosis. Moderate left subarticular stenosis with   \par encroachment upon the left S1 nerve root. No significant right subarticular stenosis. Moderate right  \par foraminal stenosis and no significant left foraminal stenosis. No significant interval change.  \par  \par  \par  \par  IMPRESSION:  \par  \par  No acute fracture. Chronic compression fractures of L1, L2, and L3, with kyphoplasty cement in L1   \par and L2. Chronic L3 compression fracture has developed since 2/20/2017.  \par  \par  Lumbar spondylosis. Left paracentral disc herniation at L5-S1 encroaches upon the left S1 nerve   \par root. No significant central canal stenosis. Foraminal stenosis as above, most pronounced at L5-S1   \par the right. Overall no significant change in stenosis when compared to 2/20/2017.  \par \par         MRI LS 2/20/17\par \par There is a moderate dextroscoliosis of the lower thoracic and upper lumbar spine with apex at the approximate L1-2 level. The normal lumbar lordosis and alignment is maintained. The marrow signal is overall within normal limits with no focal marrow replacing lesion identified.\par         There is a mild compression deformity of the superior endplate of L1 with no significant retropulsion or underlying central canal stenosis. There is a mild compression deformity of the superior endplate of L2 with minimal retropulsion and no significant underlying central canal stenosis. There is edema associated with both these fractures consistent with acute to subacute fractures. In the visualized sacrum, there is edema at the S2 and S3 vertebral bodies which is nonspecific, possibly reflecting an additional sacral fracture. No definite cortical break was seen in this region on recent CT of the lumbar spine.\par         The visualized paraspinal soft tissues appear grossly unremarkable. The conus medullaris terminates at L2 and the thecal sac terminates at S1-2. No abnormal signal is identified in the visualized spinal cord.\par         T12-L1: There is no significant disc bulge or herniation. There is bilateral facet hypertrophy. There is no significant central canal, subarticular, or foraminal stenosis.\par         L1-2: There is minimal retropulsion of the superior endplate of L2. There is minimal disc bulging. There is moderate bilateral facet/ligamentous hypertrophy. There is no significant central canal, subarticular, or foraminal zone stenosis.\par         L2-3: There is no significant disc bulge or herniation. There is moderate bilateral facet/ligamentous hypertrophy. There is no significant central canal, subarticular, or foraminal zone stenosis.\par         L3-4: There is a moderate diffuse disc bulge. There is moderate bilateral facet/ligamentous hypertrophy. There is no significant central canal or subarticular zone stenosis. There is mild left foraminal stenosis and no significant right foraminal stenosis.\par         L4-5: There is a small central disc protrusion. There is moderate bilateral facet/ligamentous hypertrophy. There is no significant central canal stenosis. There is mild bilateral subarticular zone stenosis. There is mild bilateral foraminal stenosis.\par         L5-S1: There is a left paracentral disc extrusion with mild inferiorly oriented subligamentous extension superimposed on mild diffuse disc bulge. There is moderate bilateral facet hypertrophy. There is no significant central canal stenosis. There is moderate/severe left subarticular zone stenosis with encroachment upon the left S1 nerve root. There is no significant right subarticular zone stenosis. There is moderate right foraminal stenosis and no significant left foraminal stenosis.\par \par IMPRESSION:\par         Acute/subacute compression fracture of the superior endplate of L1 with no significant retropulsion or central canal stenosis. Acute/subacute compression fracture of the superior endplate of L2 with minimal retropulsion and no significant underlying central canal stenosis. Edema in the partially imaged S2 on S3 vertebra suggest an additional sacral fracture.\par         At L5-S1, a left paracentral disc herniation encroaches upon the left S1 nerve root.\par         Lumbar scoliosis and spondylosis. No significant central canal stenosis. Foraminal stenosis most pronounced at L5-S1 on the right.\par         \par \par

## 2020-04-10 ENCOUNTER — APPOINTMENT (OUTPATIENT)
Dept: PAIN MANAGEMENT | Facility: CLINIC | Age: 55
End: 2020-04-10

## 2021-12-22 ENCOUNTER — APPOINTMENT (OUTPATIENT)
Dept: PAIN MANAGEMENT | Facility: CLINIC | Age: 56
End: 2021-12-22
Payer: MEDICARE

## 2021-12-22 VITALS
SYSTOLIC BLOOD PRESSURE: 124 MMHG | WEIGHT: 165 LBS | HEIGHT: 70 IN | TEMPERATURE: 98 F | BODY MASS INDEX: 23.62 KG/M2 | DIASTOLIC BLOOD PRESSURE: 84 MMHG

## 2021-12-22 PROCEDURE — 99214 OFFICE O/P EST MOD 30 MIN: CPT

## 2021-12-22 NOTE — PHYSICAL EXAM
[Normal muscle bulk without asymmetry] : normal muscle bulk without asymmetry [Facet Tenderness] : facet tenderness [Spine: Flexion to ___ degrees, without pain] : spine: flexion to [unfilled] degrees, without pain [Wheelchair] : uses a wheelchair [Normal] : Normal affect

## 2021-12-22 NOTE — HISTORY OF PRESENT ILLNESS
[2] : 3. What number best describes how, during the past week, pain has interfered with your general activity? 2/10 pain [FreeTextEntry1] : Interval Note:\par \par Patient reports that she has bilateral lower back pain. Patient reports that she coughed a month ago and reported mid back pain.  Found to have T9 compression fracture on XR.  Unknown acuity.  Patient requires tramadol for pain.  Pain is so bad that patient finds it difficult to perform adls and ambulate. \par \par  Denies any additional weakness, numbness, bowel/bladder dysfunction.  \par \par \par HPI\par \par Ms. NORMA DOLAN is a 54 year F with pmhx of with history of iodinated contrast allergy with nausea and vomiting (no anaphylaxis), antiphospholipid syndrome, cva 31 years ago with left sided facial droop, left UE and LE weakness on coumadin for >5 years managed by Dr. Suh, hypertension, seizure disorder who sustained a fall in the beginning of 2/2017 and admitted to [facility] for evaluation and treatment of PNA s/p abx therapy upon discharge at [facility] in early 2/2017. As per patient she was at [place] [facility] when she fell from her walker about 1 week ago and felt her back twist. Admitted at [facility] 2/19/17 for lowre back pain found o have acute L1 and L2 compression fracture. Patient is s/p L1 L2 kyphoplasty by me 2/20/17. Discharged to [facility] subacute rehab where patient is currently. Lower back pain has significantly improved since kyphoplasty. Patient is able to participate in PT. Denies any additional weakness, numbness or fever/chills. Incision is well healed, nonerythematous. Patient does complain of axial/mechanical bilateral lower back pain for many years without any inciting event. Nonradiating, described as aching. Patient takes tramadol 50mg BID for this pain but demonstrates desire to wean secondary to history of seizures.\par        Patient presents with  bilateral lower back pain. The pain has been occuring for many years. The pain frequency is constant with exacerbations. The character of the pain is described as aching. The current pain intensity is 7/10. With activity, the pain intensity increases to  10/10. The pain increases with activity. The pain is decreased by rest, medications. Patient reports that perfoming activities of daily living difficult. \par    History:  \par        Previous nerve block or injection \par \par LEFT AND RIGHT  L3-sacral ala medial branch radiofrequency ablation 10/20/19\par BILATERAL L3-sacral ala diagnostic medial branch block 9/5/19\par         B/L L3-sacral ryan mbb RFA by me 5/16/17 with 100% relief of lower back pain\par         \par         Bilateral L3-sacral ryan mbb by me 3/16/17 with 90% relief of back pain for 15 hours\par         \par         Bilateral L3-sacral ryan mbb by me 4/13/17 with 50% relief of back pain for 8 hours\par         \par         L1 L2 kyphoplasty by me 2/20/17\par         . Imaging studies  \par \par MRI LS 7/2019\par \par There is a mild levoscoliosis of the lower lumbar spine. A levoscoliosis of the visualized thoracic  \par spine is noted. The lumbar lordosis and alignment is grossly maintained. The marrow signal is   \par overall within normal limits with no focal marrow replacing lesion identified.  \par  \par  There has been interval kyphoplasty of previously seen L1 and L2 compression fractures. There is   \par mild loss of height of the vertebral bodies with no significant retropulsion or underlying central   \par canal stenosis. There is additionally a mild chronic compression fracture of the superior endplate   \par which has developed in the interval. There is no associated vertebral edema. No acute fracture is   \par identified. There is mild degenerative endplate edema at L5-S1. Otherwise no abnormal vertebral or   \par paraspinal edema is appreciated. The visualized paraspinal soft tissues appear grossly unremarkable.  \par  \par  The conus medullaris terminates at L2 and the thecal sac terminates at S2. No abnormal signal is   \par identified in the visualized spinal cord.  \par  \par  L1-2: Mild disc bulge and mild ligamentous hypertrophy. No significant central canal, subarticular,  \par or foraminal stenosis. No significant interval change.  \par  \par  L2-3: Mild disc bulge and mild ligamentous hypertrophy. No significant central canal, subarticular,  \par or foraminal stenosis. Mild increase in disc bulging associated with the compression of superior   \par endplate of L3. No significant increase in stenosis.  \par  \par  L3-4: Mild disc bulge. Mild facet/ligamentous hypertrophy. No significant central canal or   \par subarticular stenosis. Mild/moderate left foraminal stenosis and no significant right foraminal   \par stenosis. No significant interval change.  \par  \par  L4-5: Minimal disc bulge and mild facet/ligamentous hypertrophy. No significant central canal or   \par subarticular stenosis. Mild bilateral foraminal stenosis. Previously seen central disc protrusion is  \par no longer appreciated. Otherwise no significant interval change.  \par  \par  L5-S1: Small left paracentral disc extrusion with inferiorly oriented extension superimposed on a   \par mild disc bulge. No significant central canal stenosis. Moderate left subarticular stenosis with   \par encroachment upon the left S1 nerve root. No significant right subarticular stenosis. Moderate right  \par foraminal stenosis and no significant left foraminal stenosis. No significant interval change.  \par  \par  \par  \par  IMPRESSION:  \par  \par  No acute fracture. Chronic compression fractures of L1, L2, and L3, with kyphoplasty cement in L1   \par and L2. Chronic L3 compression fracture has developed since 2/20/2017.  \par  \par  Lumbar spondylosis. Left paracentral disc herniation at L5-S1 encroaches upon the left S1 nerve   \par root. No significant central canal stenosis. Foraminal stenosis as above, most pronounced at L5-S1   \par the right. Overall no significant change in stenosis when compared to 2/20/2017.  \par \par         MRI LS 2/20/17\par \par There is a moderate dextroscoliosis of the lower thoracic and upper lumbar spine with apex at the approximate L1-2 level. The normal lumbar lordosis and alignment is maintained. The marrow signal is overall within normal limits with no focal marrow replacing lesion identified.\par         There is a mild compression deformity of the superior endplate of L1 with no significant retropulsion or underlying central canal stenosis. There is a mild compression deformity of the superior endplate of L2 with minimal retropulsion and no significant underlying central canal stenosis. There is edema associated with both these fractures consistent with acute to subacute fractures. In the visualized sacrum, there is edema at the S2 and S3 vertebral bodies which is nonspecific, possibly reflecting an additional sacral fracture. No definite cortical break was seen in this region on recent CT of the lumbar spine.\par         The visualized paraspinal soft tissues appear grossly unremarkable. The conus medullaris terminates at L2 and the thecal sac terminates at S1-2. No abnormal signal is identified in the visualized spinal cord.\par         T12-L1: There is no significant disc bulge or herniation. There is bilateral facet hypertrophy. There is no significant central canal, subarticular, or foraminal stenosis.\par         L1-2: There is minimal retropulsion of the superior endplate of L2. There is minimal disc bulging. There is moderate bilateral facet/ligamentous hypertrophy. There is no significant central canal, subarticular, or foraminal zone stenosis.\par         L2-3: There is no significant disc bulge or herniation. There is moderate bilateral facet/ligamentous hypertrophy. There is no significant central canal, subarticular, or foraminal zone stenosis.\par         L3-4: There is a moderate diffuse disc bulge. There is moderate bilateral facet/ligamentous hypertrophy. There is no significant central canal or subarticular zone stenosis. There is mild left foraminal stenosis and no significant right foraminal stenosis.\par         L4-5: There is a small central disc protrusion. There is moderate bilateral facet/ligamentous hypertrophy. There is no significant central canal stenosis. There is mild bilateral subarticular zone stenosis. There is mild bilateral foraminal stenosis.\par         L5-S1: There is a left paracentral disc extrusion with mild inferiorly oriented subligamentous extension superimposed on mild diffuse disc bulge. There is moderate bilateral facet hypertrophy. There is no significant central canal stenosis. There is moderate/severe left subarticular zone stenosis with encroachment upon the left S1 nerve root. There is no significant right subarticular zone stenosis. There is moderate right foraminal stenosis and no significant left foraminal stenosis.\par \par IMPRESSION:\par         Acute/subacute compression fracture of the superior endplate of L1 with no significant retropulsion or central canal stenosis. Acute/subacute compression fracture of the superior endplate of L2 with minimal retropulsion and no significant underlying central canal stenosis. Edema in the partially imaged S2 on S3 vertebra suggest an additional sacral fracture.\par         At L5-S1, a left paracentral disc herniation encroaches upon the left S1 nerve root.\par         Lumbar scoliosis and spondylosis. No significant central canal stenosis. Foraminal stenosis most pronounced at L5-S1 on the right.\par         \par \par  [FreeTextEntry2] : 6

## 2021-12-22 NOTE — ASSESSMENT
[FreeTextEntry1] : >> Imaging and Other Studies\par \par back and leg pain likely secondary to lumbar radiculopathy and discogenic pain refractory to conservative treatments including 6 consecutive weeks of home exercises/PT, will obtain MRI LS to evaluate for pathology\par \par may consider PT vs intervention pending eval\par \par obtain MRI TS to evaluate for pathology to evaluate acuity of thoracic vcf\par \par >> Therapy and Other Modalities\par \par hold PT\par \par >> Medications\par  \par continue PT\par \par Compression fracture of lumbar vertebra, non-traumatic, sequela \par compression fracture of L1 and L2 s/p kyphoplasty with significant improvement in pain and function. \par now with chronic L3 vcf \par \par gabapentin 100mg nightly\par acetaminophen 650mg q8h prn pain (caution <3g daily)\par \par Regarding opiate medication to manage pain. I had a detailed discussion with the patient regarding the risks of long-term opioid use, including the potential for medication side effects, hyperaglesia, endocrine dysfunction,  Encouraged weaning with assistance of current prescriber.\par minimize tramadol use\par \par >> Interventions\par \par Significant component of axial back pain secondary to lumbar spondylosis and facet arthropathy demonstrated on MRI LS.  Pain refractory to conservative treatments.  s/p BILATERAL L3-sacral ala diagnostic medial branch block with significant improvement\par \par Given significant relief from diagnostic lumbar medial branch block of >80%, and continued lumbar facet arthropathy pain and axial back pain, s/p LEFT AND RIGHT  L3-sacral ala medial branch radiofrequency ablation with improvement in axial back pain\par \par may consider repeat mb rfa\par \par >> Consults\par \par continue care with endocrinologist for osteoporosis\par \par >> Discussion of Risks/Benefits/Alternatives\par \par 	>Regarding any scheduled procedures:\par \par I have discussed in detail with the patient that any interventional pain procedure is associated with potential risks.  The procedure may include an injection of steroids and potentially other medications (local anesthetic and normal saline) into the epidural space or surrounding tissue of the spine.  There are significant risks of this procedure which include and are not limited to infection, bleeding, worsening pain, dural puncture leading to postdural puncture headache, nerve damage, spinal cord injury, paralysis, stroke, and death.  \par \par There is a chance that the procedure does not improve their pain.  \par \par There are risks associated with the steroid being absorbed into the body systemically.  These include dysphoria, difficulty sleeping, mood swings and personality changes.  Premenopausal women may notice an irregularity in her menstrual cycle for 2-3 months following the injection.  Steroids can specifically affect patients with hypertension, diabetes, and peptic ulcers.  The procedure may cause a temporary increase in blood pressure and blood pressure, and may adversely affect a peptic ulcer.  Other, more rare complications, include avascular necrosis of joints, glaucoma and worsening of osteoporosis. \par \par I have discussed the risks of the procedure at length with the patient, and the potential benefits of pain relief.  I have offered alternatives to the procedure.  All questions were answered.  \par \par The patient expressed understanding and wishes to proceed with the procedure.\par \par 	>Regarding COVID19 Pandemic: \par \par Any planned interventional pain procedure are scheduled because further delay may cause harm or negative outcome to patient.  The goal in performing this procedure is to avoid deterioration of function, emergency room visits (which increases exposure) and reliance on opioids.  \par \par r/b/a discussed with patient, lack of evidence to conclusively determine whether pain management procedures have any positive or negative impact on the possibility of tracey the virus and/or development of any sequelae. \par \par Patient counselled regarding timing steroid based intervention 2 weeks before or after COVID-19 vaccine administration to avoid any interaction or affect on efficacy of vaccination\par \par Patient demonstrates understanding\par \par Informed patient that risks associated with the COVID-19 infection.  Informed patient steps taken to limit the risks.  We are implementing safety precautions and following protocols consistent with the CDC and state recommendations. All patients and staff will be checked for fever or signs of illness upon entry to the facility. We will limit our steroid dose to the lowest effective therapeutic dose or in some cases steroids will not be injected at all. \par \par Patient agrees to proceed\par \par >> Conclusion\par \par The above diagnosis and treatment plan is medically reasonable and necessary based on the patient encounter \par There were no barriers to communication.\par Informed patient that I would be available for any additional questions.\par Patient was instructed to call with any worsening symptoms including severe pain, new numbness/weakness, or changes in the bowel/bladder function. \par Discussed role of nsaids in pain management and all relevant risks, if patient is continuing to require after 4 weeks the patient should f/u for alternative treatment. \par Instructed patient to maintain pain diary to monitor pain level, mobility, and function.\par \par \par \par \par \par

## 2022-01-12 ENCOUNTER — RESULT REVIEW (OUTPATIENT)
Age: 57
End: 2022-01-12

## 2022-02-11 ENCOUNTER — APPOINTMENT (OUTPATIENT)
Dept: PAIN MANAGEMENT | Facility: CLINIC | Age: 57
End: 2022-02-11
Payer: MEDICARE

## 2022-02-11 VITALS
WEIGHT: 153 LBS | DIASTOLIC BLOOD PRESSURE: 79 MMHG | BODY MASS INDEX: 21.9 KG/M2 | SYSTOLIC BLOOD PRESSURE: 129 MMHG | HEIGHT: 70 IN | TEMPERATURE: 98 F

## 2022-02-11 PROCEDURE — 99214 OFFICE O/P EST MOD 30 MIN: CPT

## 2022-02-11 RX ORDER — OXYCODONE AND ACETAMINOPHEN 5; 325 MG/1; MG/1
5-325 TABLET ORAL
Refills: 0 | Status: ACTIVE | COMMUNITY

## 2022-02-11 NOTE — HISTORY OF PRESENT ILLNESS
[2] : 3. What number best describes how, during the past week, pain has interfered with your general activity? 2/10 pain [10] : a maximum pain level of 10/10 [FreeTextEntry1] : Interval Note:\par \par  Patient reports that she coughed a month ago and reported mid back pain.  Found to have T9 compression fracture on XR.   Pain is so bad that patient finds it difficult to perform adls and ambulate. Patient reports that she has worsening mid back pain now requiring percocet. \par \par  Denies any additional weakness, numbness, bowel/bladder dysfunction.  \par \par \par HPI\par \par Ms. 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 Suh, hypertension, seizure disorder who sustained a fall in the beginning of 2/2017 and admitted to [facility] for evaluation and treatment of PNA s/p abx therapy upon discharge at [facility] in early 2/2017. As per patient she was at [facility] when she fell from her walker about 1 week ago and felt her back twist. Admitted at [facility] 2/19/17 for lowre back pain found o have acute L1 and L2 compression fracture. Patient is s/p L1 L2 kyphoplasty by me 2/20/17. Discharged to [facility] subacute rehab where patient is currently. Lower back pain has significantly improved since kyphoplasty. Patient is able to participate in PT. Denies any additional weakness, numbness or fever/chills. Incision is well healed, nonerythematous. Patient does complain of axial/mechanical bilateral lower back pain for many years without any inciting event. Nonradiating, described as aching. Patient takes tramadol 50mg BID for this pain but demonstrates desire to wean secondary to history of seizures.\par        Patient presents with  bilateral lower back pain. The pain has been occuring for many years. The pain frequency is constant with exacerbations. The character of the pain is described as aching. The current pain intensity is 7/10. With activity, the pain intensity increases to  10/10. The pain increases with activity. The pain is decreased by rest, medications. Patient reports that perfoming activities of daily living difficult. \par    History:  \par        Previous nerve block or injection \par \par LEFT AND RIGHT  L3-sacral ala medial branch radiofrequency ablation 10/20/19\par BILATERAL L3-sacral ala diagnostic medial branch block 9/5/19\par         B/L L3-sacral ryan mbb RFA by me 5/16/17 with 100% relief of lower back pain\par         \par         Bilateral L3-sacral ryan mbb by me 3/16/17 with 90% relief of back pain for 15 hours\par         \par         Bilateral L3-sacral ryan mbb by me 4/13/17 with 50% relief of back pain for 8 hours\par         \par         L1 L2 kyphoplasty by me 2/20/17\par \par Imaging studies  \par \par MRI LS 1/22\par \par Comparison is made to a prior MRI of the lumbar spine performed on 7/9/2019.\par \par  There is normal curvature to the lumbar lordosis. There is mild dextroscoliosis of the lower\par thoracic and upper lumbar spine with apex at L1/L2, unchanged. There are mild chronic compression\par fractures of the L1, L2 and L3 vertebral bodies. The patient is status post kyphoplasty of L1 and\par L2. The remaining vertebral bodies are normal height and configuration. The intervertebral disc\par demonstrate moderate loss of disc height and T2 signal at the L3/L4 and L5/S1 and mild loss at L4/L5\par disc spaces with anterior and minimal posterior osteophyte formation consistent with desiccation and\par degenerative changes. The conus terminates at the L1 level and demonstrates no evidence of abnormal\par signal changes.\par \par  Evaluation of the individual levels demonstrates at the L5/S1 level there is a mild diffuse disc\par bulge and right-sided osteophyte mildly compressing the distal right L5 foraminal exiting nerve\par root. There is a central/left paracentral protrusion flattening the ventral thecal sac and minimally\par contacting the right descending S1 nerve root and compressing and mildly posteriorly displacing the\par left descending S1 nerve root, unchanged. There is moderate to severe right and moderate left\par foraminal narrowing. There is moderate facet and ligamentous hypertrophy. There is left lateral\par recess narrowing, unchanged. There is no evidence of spinal canal stenosis.\par \par  At the L4/L5 level there is a minimal disc bulge contacting the ventral thecal sac, unchanged.\par There is mild bilateral foraminal narrowing. There is mild to moderate facet and ligamentous\par hypertrophy. There is no evidence of spinal canal stenosis.\par \par  At the L3/L4 level there is a mild diffuse disc bulge flattening the ventral thecal sac, unchanged.\par There is minimal right and mild left foraminal narrowing. There is mild facet and ligamentous\par hypertrophy. There is no evidence of spinal canal stenosis.\par \par  At the L2/L3 level there is a minimal diffuse disc bulge contacting the ventral thecal sac,\par unchanged. The bilateral neuroforamen are patent. There is mild facet degenerative changes. There is\par no evidence of spinal canal stenosis.\par \par  At the L1/L2 and T12/L1 levels there are no evidence of focal disc herniation or spinal canal\par stenosis.\par \par \par  Impression:\par \par  Mild chronic compression fractures of the L1, L2 and L3 vertebral bodies, unchanged. Status post\par post kyphoplasty of L1 and L2, unchanged.\par \par  Degenerative changes of the lumbar spine was notable at L5/S1\par \par  L5/S1  mild diffuse disc bulge and right-sided osteophyte mildly compressing the distal right L5\par foraminal exiting nerve root, unchanged. There is a central/left paracentral protrusion flattening\par the ventral thecal sac and minimally contacting the right descending S1 nerve root and compressing\par and mildly posteriorly displacing the left descending S1 nerve root, unchanged. L5/S1 no evidence of\par spinal canal stenosis.\par \par MRI TS 1/22\par \par  MRI of the thoracic spine was performed utilizing sagittal  T1, axial and sagittal T2-\par weighted and axial gradient echo images as well as sagittal STIR images.\par \par  There are no prior studies available for comparison.\par \par  Findings: There is moderate loss of height of the T8 vertebral body with a small band of STIR\par signal hyperintensity seen at the superior endplate; findings are consistent with an acute to\par subacute moderate T8 compression fracture. There is no evidence of retropulsion of fracture\par fragments.\par \par  There is kyphosis of the thoracic spine. There is a Schmorl's node seen at the superior endplate of\par T2 inferior endplate of T8. The remaining vertebral bodies are of normal height and configuration.\par The intervertebral discs spaces demonstrate mild loss of disc height and T2 signal within the T1/T2,\par T2/T3, T5/T6, T6/T7, T7/T8 and T8/T9 consistent with desiccation. There is an approximately 0.6 cm\par focus of T1, T2 and STIR signal marked hypointensity that may represent a small bone island at the\par superior endplate of T9. Evaluation of the spinal cord demonstrates no evidence of abnormal signal\par changes.\par \par  Evaluation of the individual levels demonstrates no evidence of a focal disc herniation or spinal\par cord compression.\par \par  There is an approximately 1.2 cm rounded soft tissue nodule along the left adrenal gland; for\par further evaluation, would recommend CT or MRI utilizing adrenal gland protocol.\par \par \par  Impression:\par \par  Moderate acute to subacute compression fracture of the superior endplate of T8.\par \par  No evidence of focal disc herniation or spinal cord compression.\par \par \par  Approximately 1.2 cm rounded soft tissue nodule along the left adrenal gland; for further\par evaluation, would recommend CT or MRI utilizing adrenal gland protocol.\par \par \par MRI LS 7/2019\par \par There is a mild levoscoliosis of the lower lumbar spine. A levoscoliosis of the visualized thoracic  \par spine is noted. The lumbar lordosis and alignment is grossly maintained. The marrow signal is   \par overall within normal limits with no focal marrow replacing lesion identified.  \par  \par  There has been interval kyphoplasty of previously seen L1 and L2 compression fractures. There is   \par mild loss of height of the vertebral bodies with no significant retropulsion or underlying central   \par canal stenosis. There is additionally a mild chronic compression fracture of the superior endplate   \par which has developed in the interval. There is no associated vertebral edema. No acute fracture is   \par identified. There is mild degenerative endplate edema at L5-S1. Otherwise no abnormal vertebral or   \par paraspinal edema is appreciated. The visualized paraspinal soft tissues appear grossly unremarkable.  \par  \par  The conus medullaris terminates at L2 and the thecal sac terminates at S2. No abnormal signal is   \par identified in the visualized spinal cord.  \par  \par  L1-2: Mild disc bulge and mild ligamentous hypertrophy. No significant central canal, subarticular,  \par or foraminal stenosis. No significant interval change.  \par  \par  L2-3: Mild disc bulge and mild ligamentous hypertrophy. No significant central canal, subarticular,  \par or foraminal stenosis. Mild increase in disc bulging associated with the compression of superior   \par endplate of L3. No significant increase in stenosis.  \par  \par  L3-4: Mild disc bulge. Mild facet/ligamentous hypertrophy. No significant central canal or   \par subarticular stenosis. Mild/moderate left foraminal stenosis and no significant right foraminal   \par stenosis. No significant interval change.  \par  \par  L4-5: Minimal disc bulge and mild facet/ligamentous hypertrophy. No significant central canal or   \par subarticular stenosis. Mild bilateral foraminal stenosis. Previously seen central disc protrusion is  \par no longer appreciated. Otherwise no significant interval change.  \par  \par  L5-S1: Small left paracentral disc extrusion with inferiorly oriented extension superimposed on a   \par mild disc bulge. No significant central canal stenosis. Moderate left subarticular stenosis with   \par encroachment upon the left S1 nerve root. No significant right subarticular stenosis. Moderate right  \par foraminal stenosis and no significant left foraminal stenosis. No significant interval change.  \par  \par  \par  \par  IMPRESSION:  \par  \par  No acute fracture. Chronic compression fractures of L1, L2, and L3, with kyphoplasty cement in L1   \par and L2. Chronic L3 compression fracture has developed since 2/20/2017.  \par  \par  Lumbar spondylosis. Left paracentral disc herniation at L5-S1 encroaches upon the left S1 nerve   \par root. No significant central canal stenosis. Foraminal stenosis as above, most pronounced at L5-S1   \par the right. Overall no significant change in stenosis when compared to 2/20/2017.  \par \par         MRI LS 2/20/17\par \par There is a moderate dextroscoliosis of the lower thoracic and upper lumbar spine with apex at the approximate L1-2 level. The normal lumbar lordosis and alignment is maintained. The marrow signal is overall within normal limits with no focal marrow replacing lesion identified.\par         There is a mild compression deformity of the superior endplate of L1 with no significant retropulsion or underlying central canal stenosis. There is a mild compression deformity of the superior endplate of L2 with minimal retropulsion and no significant underlying central canal stenosis. There is edema associated with both these fractures consistent with acute to subacute fractures. In the visualized sacrum, there is edema at the S2 and S3 vertebral bodies which is nonspecific, possibly reflecting an additional sacral fracture. No definite cortical break was seen in this region on recent CT of the lumbar spine.\par         The visualized paraspinal soft tissues appear grossly unremarkable. The conus medullaris terminates at L2 and the thecal sac terminates at S1-2. No abnormal signal is identified in the visualized spinal cord.\par         T12-L1: There is no significant disc bulge or herniation. There is bilateral facet hypertrophy. There is no significant central canal, subarticular, or foraminal stenosis.\par         L1-2: There is minimal retropulsion of the superior endplate of L2. There is minimal disc bulging. There is moderate bilateral facet/ligamentous hypertrophy. There is no significant central canal, subarticular, or foraminal zone stenosis.\par         L2-3: There is no significant disc bulge or herniation. There is moderate bilateral facet/ligamentous hypertrophy. There is no significant central canal, subarticular, or foraminal zone stenosis.\par         L3-4: There is a moderate diffuse disc bulge. There is moderate bilateral facet/ligamentous hypertrophy. There is no significant central canal or subarticular zone stenosis. There is mild left foraminal stenosis and no significant right foraminal stenosis.\par         L4-5: There is a small central disc protrusion. There is moderate bilateral facet/ligamentous hypertrophy. There is no significant central canal stenosis. There is mild bilateral subarticular zone stenosis. There is mild bilateral foraminal stenosis.\par         L5-S1: There is a left paracentral disc extrusion with mild inferiorly oriented subligamentous extension superimposed on mild diffuse disc bulge. There is moderate bilateral facet hypertrophy. There is no significant central canal stenosis. There is moderate/severe left subarticular zone stenosis with encroachment upon the left S1 nerve root. There is no significant right subarticular zone stenosis. There is moderate right foraminal stenosis and no significant left foraminal stenosis.\par \par IMPRESSION:\par         Acute/subacute compression fracture of the superior endplate of L1 with no significant retropulsion or central canal stenosis. Acute/subacute compression fracture of the superior endplate of L2 with minimal retropulsion and no significant underlying central canal stenosis. Edema in the partially imaged S2 on S3 vertebra suggest an additional sacral fracture.\par         At L5-S1, a left paracentral disc herniation encroaches upon the left S1 nerve root.\par         Lumbar scoliosis and spondylosis. No significant central canal stenosis. Foraminal stenosis most pronounced at L5-S1 on the right.\par         \par \par  [FreeTextEntry2] : 6

## 2022-02-11 NOTE — PHYSICAL EXAM
[Spinous Process Tenderness] : spinous process tenderness [Facet Tenderness] : facet tenderness [Normal] : Normal affect

## 2022-02-11 NOTE — ASSESSMENT
[FreeTextEntry1] : >> Imaging and Other Studies\par \par I personally reviewed the relevant imaging.  Discussed and explained to patient the likely source of pathology and pain.  Questions answered. MRI\par \par >> Therapy and Other Modalities\par \par hold PT\par \par >> Medications\par  \par Regarding opiate medication to manage pain. I had a detailed discussion with the patient regarding the risks of long-term opioid use, including the potential for medication side effects, hyperaglesia, endocrine dysfunction,  \par \par Severe lower back pain with axial movement.  Pain is refractory to conservative treatment including tylenol and exercises.  Patient unable to move because of the pain and unable to perform adls.  Because of progressive deterioration secondary to severe pain from vertebral compression fracture with edema demonstrated on imaging, will schedule T8 vertebral kyphoplasty r/b/a discussed\par \par Patient will obtain presurgical clearance\par \par patient will consult with prescriber regarding holding coumadin 5 days prior to procedure, check INR day of procedure\par \par \par Compression fracture of lumbar vertebra, non-traumatic, sequela \par compression fracture of L1 and L2 s/p kyphoplasty with significant improvement in pain and function. \par now with chronic L3 vcf \par \par gabapentin 100mg nightly\par acetaminophen 650mg q8h prn pain (caution <3g daily)\par \par Regarding opiate medication to manage pain. I had a detailed discussion with the patient regarding the risks of long-term opioid use, including the potential for medication side effects, hyperaglesia, endocrine dysfunction,  Encouraged weaning with assistance of current prescriber.\par minimize tramadol use\par \par >> Interventions\par \par Significant component of axial back pain secondary to lumbar spondylosis and facet arthropathy demonstrated on MRI LS.  Pain refractory to conservative treatments.  s/p BILATERAL L3-sacral ala diagnostic medial branch block with significant improvement\par \par Given significant relief from diagnostic lumbar medial branch block of >80%, and continued lumbar facet arthropathy pain and axial back pain, s/p LEFT AND RIGHT  L3-sacral ala medial branch radiofrequency ablation with improvement in axial back pain\par \par may consider repeat mb rfa\par \par >> Consults\par \par referral endocrinologist for osteoporosis\par \par >> Discussion of Risks/Benefits/Alternatives\par \par 	>Regarding any scheduled procedures:\par \par I have discussed in detail with the patient that any interventional pain procedure is associated with potential risks.  The procedure may include an injection of steroids and potentially other medications (local anesthetic and normal saline) into the epidural space or surrounding tissue of the spine.  There are significant risks of this procedure which include and are not limited to infection, bleeding, worsening pain, dural puncture leading to postdural puncture headache, nerve damage, spinal cord injury, paralysis, stroke, and death.  \par \par There is a chance that the procedure does not improve their pain.  \par \par There are risks associated with the steroid being absorbed into the body systemically.  These include dysphoria, difficulty sleeping, mood swings and personality changes.  Premenopausal women may notice an irregularity in her menstrual cycle for 2-3 months following the injection.  Steroids can specifically affect patients with hypertension, diabetes, and peptic ulcers.  The procedure may cause a temporary increase in blood pressure and blood pressure, and may adversely affect a peptic ulcer.  Other, more rare complications, include avascular necrosis of joints, glaucoma and worsening of osteoporosis. \par \par I have discussed the risks of the procedure at length with the patient, and the potential benefits of pain relief.  I have offered alternatives to the procedure.  All questions were answered.  \par \par The patient expressed understanding and wishes to proceed with the procedure.\par \par 	>Regarding COVID19 Pandemic: \par \par Any planned interventional pain procedure are scheduled because further delay may cause harm or negative outcome to patient.  The goal in performing this procedure is to avoid deterioration of function, emergency room visits (which increases exposure) and reliance on opioids.  \par \par r/b/a discussed with patient, lack of evidence to conclusively determine whether pain management procedures have any positive or negative impact on the possibility of tracey the virus and/or development of any sequelae. \par \par Patient counselled regarding timing steroid based intervention 2 weeks before or after COVID-19 vaccine administration to avoid any interaction or affect on efficacy of vaccination\par \par Patient demonstrates understanding\par \par Informed patient that risks associated with the COVID-19 infection.  Informed patient steps taken to limit the risks.  We are implementing safety precautions and following protocols consistent with the CDC and state recommendations. All patients and staff will be checked for fever or signs of illness upon entry to the facility. We will limit our steroid dose to the lowest effective therapeutic dose or in some cases steroids will not be injected at all. \par \par Patient agrees to proceed\par \par >> Conclusion\par \par The above diagnosis and treatment plan is medically reasonable and necessary based on the patient encounter \par There were no barriers to communication.\par Informed patient that I would be available for any additional questions.\par Patient was instructed to call with any worsening symptoms including severe pain, new numbness/weakness, or changes in the bowel/bladder function. \par Discussed role of nsaids in pain management and all relevant risks, if patient is continuing to require after 4 weeks the patient should f/u for alternative treatment. \par Instructed patient to maintain pain diary to monitor pain level, mobility, and function.\par \par \par \par \par \par

## 2022-03-02 ENCOUNTER — RESULT REVIEW (OUTPATIENT)
Age: 57
End: 2022-03-02

## 2022-03-03 ENCOUNTER — RESULT REVIEW (OUTPATIENT)
Age: 57
End: 2022-03-03

## 2022-03-03 ENCOUNTER — APPOINTMENT (OUTPATIENT)
Dept: PAIN MANAGEMENT | Facility: HOSPITAL | Age: 57
End: 2022-03-03

## 2022-08-10 ENCOUNTER — APPOINTMENT (OUTPATIENT)
Dept: PAIN MANAGEMENT | Facility: CLINIC | Age: 57
End: 2022-08-10

## 2022-08-10 VITALS
BODY MASS INDEX: 21.9 KG/M2 | HEIGHT: 70 IN | TEMPERATURE: 98 F | SYSTOLIC BLOOD PRESSURE: 115 MMHG | WEIGHT: 153 LBS | DIASTOLIC BLOOD PRESSURE: 80 MMHG

## 2022-08-10 PROCEDURE — 99214 OFFICE O/P EST MOD 30 MIN: CPT

## 2022-08-10 NOTE — ASSESSMENT
[FreeTextEntry1] : >> Imaging and Other Studies\par \par I personally reviewed the relevant imaging.  Discussed and explained to patient the likely source of pathology and pain.  Questions answered. Xr\par \par back and leg pain likely secondary to concerning for VCF (hx of multiple VCF)  pain refractory to conservative treatments including requiring Opioids for pain treatment, will obtain MRI TS to evaluate for pathology including additional vertebral compression fractures\par \par may consider PT vs intervention pending eval\par \par \par >> Therapy and Other Modalities\par \par hold PT\par \par >> Medications\par  \par Regarding opiate medication to manage pain. I had a detailed discussion with the patient regarding the risks of long-term opioid use, including the potential for medication side effects, hyperaglesia, endocrine dysfunction,  \par \par Severe lower back pain with axial movement.  Pain is refractory to conservative treatment including tylenol and exercises.  Patient unable to move because of the pain and unable to perform adls.  Because of progressive deterioration secondary to severe pain from vertebral compression fracture with edema demonstrated on imaging, sp T8 vertebral kyphoplasty \par \par \par Compression fracture of lumbar vertebra, non-traumatic, sequela \par compression fracture of L1 and L2 s/p kyphoplasty with significant improvement in pain and function. \par now with chronic L3 vcf \par \par gabapentin 100mg nightly\par acetaminophen 650mg q8h prn pain (caution <3g daily)\par \par Regarding opiate medication to manage pain. I had a detailed discussion with the patient regarding the risks of long-term opioid use, including the potential for medication side effects, hyperaglesia, endocrine dysfunction,  Encouraged weaning with assistance of current prescriber.\par minimize tramadol use\par \par >> Interventions\par \par Significant component of axial back pain secondary to lumbar spondylosis and facet arthropathy demonstrated on MRI LS.  Pain refractory to conservative treatments.  s/p BILATERAL L3-sacral ala diagnostic medial branch block with significant improvement\par \par Given significant relief from diagnostic lumbar medial branch block of >80%, and continued lumbar facet arthropathy pain and axial back pain, s/p LEFT AND RIGHT  L3-sacral ala medial branch radiofrequency ablation with improvement in axial back pain\par \par may consider repeat mb rfa\par \par >> Consults\par \par referral endocrinologist for osteoporosis\par \par >> Discussion of Risks/Benefits/Alternatives\par \par 	>Regarding any scheduled procedures:\par \par I have discussed in detail with the patient that any interventional pain procedure is associated with potential risks.  The procedure may include an injection of steroids and potentially other medications (local anesthetic and normal saline) into the epidural space or surrounding tissue of the spine.  There are significant risks of this procedure which include and are not limited to infection, bleeding, worsening pain, dural puncture leading to postdural puncture headache, nerve damage, spinal cord injury, paralysis, stroke, and death.  \par \par There is a chance that the procedure does not improve their pain.  \par \par There are risks associated with the steroid being absorbed into the body systemically.  These include dysphoria, difficulty sleeping, mood swings and personality changes.  Premenopausal women may notice an irregularity in her menstrual cycle for 2-3 months following the injection.  Steroids can specifically affect patients with hypertension, diabetes, and peptic ulcers.  The procedure may cause a temporary increase in blood pressure and blood pressure, and may adversely affect a peptic ulcer.  Other, more rare complications, include avascular necrosis of joints, glaucoma and worsening of osteoporosis. \par \par I have discussed the risks of the procedure at length with the patient, and the potential benefits of pain relief.  I have offered alternatives to the procedure.  All questions were answered.  \par \par The patient expressed understanding and wishes to proceed with the procedure.\par \par 	>Regarding COVID19 Pandemic: \par \par Any planned interventional pain procedure are scheduled because further delay may cause harm or negative outcome to patient.  The goal in performing this procedure is to avoid deterioration of function, emergency room visits (which increases exposure) and reliance on opioids.  \par \par r/b/a discussed with patient, lack of evidence to conclusively determine whether pain management procedures have any positive or negative impact on the possibility of tracey the virus and/or development of any sequelae. \par \par Patient counselled regarding timing steroid based intervention 2 weeks before or after COVID-19 vaccine administration to avoid any interaction or affect on efficacy of vaccination\par \par Patient demonstrates understanding\par \par Informed patient that risks associated with the COVID-19 infection.  Informed patient steps taken to limit the risks.  We are implementing safety precautions and following protocols consistent with the CDC and state recommendations. All patients and staff will be checked for fever or signs of illness upon entry to the facility. We will limit our steroid dose to the lowest effective therapeutic dose or in some cases steroids will not be injected at all. \par \par Patient agrees to proceed\par \par >> Conclusion\par \par The above diagnosis and treatment plan is medically reasonable and necessary based on the patient encounter \par There were no barriers to communication.\par Informed patient that I would be available for any additional questions.\par Patient was instructed to call with any worsening symptoms including severe pain, new numbness/weakness, or changes in the bowel/bladder function. \par Discussed role of nsaids in pain management and all relevant risks, if patient is continuing to require after 4 weeks the patient should f/u for alternative treatment. \par Instructed patient to maintain pain diary to monitor pain level, mobility, and function.\par \par \par \par \par \par

## 2022-08-10 NOTE — HISTORY OF PRESENT ILLNESS
[3] : 3. What number best describes how, during the past week, pain has interfered with your general activity? 3/10 pain [10] : a maximum pain level of 10/10 [FreeTextEntry1] : Interval Note:\par \par Patient reports that she has right mid back pain about a month ago.  She reports that the pain is worse with shifting in bed.  Pain is so bad that patient finds it difficult to perform adls and ambulate. Taking Tylenol #3 for the pain.  Continues osteoporosis treatment, on vitamin D\par \par  Denies any additional weakness, numbness, bowel/bladder dysfunction.  \par \par \par HPI\par \par Ms. 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 Suh, hypertension, seizure disorder who sustained a fall in the beginning of 2/2017 and admitted to [facility] for evaluation and treatment of PNA s/p abx therapy upon discharge at [facility] in early 2/2017. As per patient she was at [facility] when she fell from her walker about 1 week ago and felt her back twist. Admitted at [facility] 2/19/17 for lowre back pain found o have acute L1 and L2 compression fracture. Patient is s/p L1 L2 kyphoplasty by me 2/20/17. Discharged to [facility] subacute rehab where patient is currently. Lower back pain has significantly improved since kyphoplasty. Patient is able to participate in PT. Denies any additional weakness, numbness or fever/chills. Incision is well healed, nonerythematous. Patient does complain of axial/mechanical bilateral lower back pain for many years without any inciting event. Nonradiating, described as aching. Patient takes tramadol 50mg BID for this pain but demonstrates desire to wean secondary to history of seizures.\par        Patient presents with  bilateral lower back pain. The pain has been occuring for many years. The pain frequency is constant with exacerbations. The character of the pain is described as aching. The current pain intensity is 7/10. With activity, the pain intensity increases to  10/10. The pain increases with activity. The pain is decreased by rest, medications. Patient reports that perfoming activities of daily living difficult. \par    History:  \par        Previous nerve block or injection \par \par T8 kyphoplasty 3/3/22\par LEFT AND RIGHT  L3-sacral ala medial branch radiofrequency ablation 10/20/19\par BILATERAL L3-sacral ala diagnostic medial branch block 9/5/19\par         B/L L3-sacral ryan mbb RFA by me 5/16/17 with 100% relief of lower back pain\par         \par         Bilateral L3-sacral ryan mbb by me 3/16/17 with 90% relief of back pain for 15 hours\par         \par         Bilateral L3-sacral ryan mbb by me 4/13/17 with 50% relief of back pain for 8 hours\par         \par         L1 L2 kyphoplasty by me 2/20/17\par \par Imaging studies  \par \par MRI LS 1/22\par \par Comparison is made to a prior MRI of the lumbar spine performed on 7/9/2019.\par \par  There is normal curvature to the lumbar lordosis. There is mild dextroscoliosis of the lower\par thoracic and upper lumbar spine with apex at L1/L2, unchanged. There are mild chronic compression\par fractures of the L1, L2 and L3 vertebral bodies. The patient is status post kyphoplasty of L1 and\par L2. The remaining vertebral bodies are normal height and configuration. The intervertebral disc\par demonstrate moderate loss of disc height and T2 signal at the L3/L4 and L5/S1 and mild loss at L4/L5\par disc spaces with anterior and minimal posterior osteophyte formation consistent with desiccation and\par degenerative changes. The conus terminates at the L1 level and demonstrates no evidence of abnormal\par signal changes.\par \par  Evaluation of the individual levels demonstrates at the L5/S1 level there is a mild diffuse disc\par bulge and right-sided osteophyte mildly compressing the distal right L5 foraminal exiting nerve\par root. There is a central/left paracentral protrusion flattening the ventral thecal sac and minimally\par contacting the right descending S1 nerve root and compressing and mildly posteriorly displacing the\par left descending S1 nerve root, unchanged. There is moderate to severe right and moderate left\par foraminal narrowing. There is moderate facet and ligamentous hypertrophy. There is left lateral\par recess narrowing, unchanged. There is no evidence of spinal canal stenosis.\par \par  At the L4/L5 level there is a minimal disc bulge contacting the ventral thecal sac, unchanged.\par There is mild bilateral foraminal narrowing. There is mild to moderate facet and ligamentous\par hypertrophy. There is no evidence of spinal canal stenosis.\par \par  At the L3/L4 level there is a mild diffuse disc bulge flattening the ventral thecal sac, unchanged.\par There is minimal right and mild left foraminal narrowing. There is mild facet and ligamentous\par hypertrophy. There is no evidence of spinal canal stenosis.\par \par  At the L2/L3 level there is a minimal diffuse disc bulge contacting the ventral thecal sac,\par unchanged. The bilateral neuroforamen are patent. There is mild facet degenerative changes. There is\par no evidence of spinal canal stenosis.\par \par  At the L1/L2 and T12/L1 levels there are no evidence of focal disc herniation or spinal canal\par stenosis.\par \par \par  Impression:\par \par  Mild chronic compression fractures of the L1, L2 and L3 vertebral bodies, unchanged. Status post\par post kyphoplasty of L1 and L2, unchanged.\par \par  Degenerative changes of the lumbar spine was notable at L5/S1\par \par  L5/S1  mild diffuse disc bulge and right-sided osteophyte mildly compressing the distal right L5\par foraminal exiting nerve root, unchanged. There is a central/left paracentral protrusion flattening\par the ventral thecal sac and minimally contacting the right descending S1 nerve root and compressing\par and mildly posteriorly displacing the left descending S1 nerve root, unchanged. L5/S1 no evidence of\par spinal canal stenosis.\par \par MRI TS 1/22\par \par  MRI of the thoracic spine was performed utilizing sagittal  T1, axial and sagittal T2-\par weighted and axial gradient echo images as well as sagittal STIR images.\par \par  There are no prior studies available for comparison.\par \par  Findings: There is moderate loss of height of the T8 vertebral body with a small band of STIR\par signal hyperintensity seen at the superior endplate; findings are consistent with an acute to\par subacute moderate T8 compression fracture. There is no evidence of retropulsion of fracture\par fragments.\par \par  There is kyphosis of the thoracic spine. There is a Schmorl's node seen at the superior endplate of\par T2 inferior endplate of T8. The remaining vertebral bodies are of normal height and configuration.\par The intervertebral discs spaces demonstrate mild loss of disc height and T2 signal within the T1/T2,\par T2/T3, T5/T6, T6/T7, T7/T8 and T8/T9 consistent with desiccation. There is an approximately 0.6 cm\par focus of T1, T2 and STIR signal marked hypointensity that may represent a small bone island at the\par superior endplate of T9. Evaluation of the spinal cord demonstrates no evidence of abnormal signal\par changes.\par \par  Evaluation of the individual levels demonstrates no evidence of a focal disc herniation or spinal\par cord compression.\par \par  There is an approximately 1.2 cm rounded soft tissue nodule along the left adrenal gland; for\par further evaluation, would recommend CT or MRI utilizing adrenal gland protocol.\par \par \par  Impression:\par \par  Moderate acute to subacute compression fracture of the superior endplate of T8.\par \par  No evidence of focal disc herniation or spinal cord compression.\par \par \par  Approximately 1.2 cm rounded soft tissue nodule along the left adrenal gland; for further\par evaluation, would recommend CT or MRI utilizing adrenal gland protocol.\par \par \par MRI LS 7/2019\par \par There is a mild levoscoliosis of the lower lumbar spine. A levoscoliosis of the visualized thoracic  \par spine is noted. The lumbar lordosis and alignment is grossly maintained. The marrow signal is   \par overall within normal limits with no focal marrow replacing lesion identified.  \par  \par  There has been interval kyphoplasty of previously seen L1 and L2 compression fractures. There is   \par mild loss of height of the vertebral bodies with no significant retropulsion or underlying central   \par canal stenosis. There is additionally a mild chronic compression fracture of the superior endplate   \par which has developed in the interval. There is no associated vertebral edema. No acute fracture is   \par identified. There is mild degenerative endplate edema at L5-S1. Otherwise no abnormal vertebral or   \par paraspinal edema is appreciated. The visualized paraspinal soft tissues appear grossly unremarkable.  \par  \par  The conus medullaris terminates at L2 and the thecal sac terminates at S2. No abnormal signal is   \par identified in the visualized spinal cord.  \par  \par  L1-2: Mild disc bulge and mild ligamentous hypertrophy. No significant central canal, subarticular,  \par or foraminal stenosis. No significant interval change.  \par  \par  L2-3: Mild disc bulge and mild ligamentous hypertrophy. No significant central canal, subarticular,  \par or foraminal stenosis. Mild increase in disc bulging associated with the compression of superior   \par endplate of L3. No significant increase in stenosis.  \par  \par  L3-4: Mild disc bulge. Mild facet/ligamentous hypertrophy. No significant central canal or   \par subarticular stenosis. Mild/moderate left foraminal stenosis and no significant right foraminal   \par stenosis. No significant interval change.  \par  \par  L4-5: Minimal disc bulge and mild facet/ligamentous hypertrophy. No significant central canal or   \par subarticular stenosis. Mild bilateral foraminal stenosis. Previously seen central disc protrusion is  \par no longer appreciated. Otherwise no significant interval change.  \par  \par  L5-S1: Small left paracentral disc extrusion with inferiorly oriented extension superimposed on a   \par mild disc bulge. No significant central canal stenosis. Moderate left subarticular stenosis with   \par encroachment upon the left S1 nerve root. No significant right subarticular stenosis. Moderate right  \par foraminal stenosis and no significant left foraminal stenosis. No significant interval change.  \par  \par  \par  \par  IMPRESSION:  \par  \par  No acute fracture. Chronic compression fractures of L1, L2, and L3, with kyphoplasty cement in L1   \par and L2. Chronic L3 compression fracture has developed since 2/20/2017.  \par  \par  Lumbar spondylosis. Left paracentral disc herniation at L5-S1 encroaches upon the left S1 nerve   \par root. No significant central canal stenosis. Foraminal stenosis as above, most pronounced at L5-S1   \par the right. Overall no significant change in stenosis when compared to 2/20/2017.  \par \par         MRI LS 2/20/17\par \par There is a moderate dextroscoliosis of the lower thoracic and upper lumbar spine with apex at the approximate L1-2 level. The normal lumbar lordosis and alignment is maintained. The marrow signal is overall within normal limits with no focal marrow replacing lesion identified.\par         There is a mild compression deformity of the superior endplate of L1 with no significant retropulsion or underlying central canal stenosis. There is a mild compression deformity of the superior endplate of L2 with minimal retropulsion and no significant underlying central canal stenosis. There is edema associated with both these fractures consistent with acute to subacute fractures. In the visualized sacrum, there is edema at the S2 and S3 vertebral bodies which is nonspecific, possibly reflecting an additional sacral fracture. No definite cortical break was seen in this region on recent CT of the lumbar spine.\par         The visualized paraspinal soft tissues appear grossly unremarkable. The conus medullaris terminates at L2 and the thecal sac terminates at S1-2. No abnormal signal is identified in the visualized spinal cord.\par         T12-L1: There is no significant disc bulge or herniation. There is bilateral facet hypertrophy. There is no significant central canal, subarticular, or foraminal stenosis.\par         L1-2: There is minimal retropulsion of the superior endplate of L2. There is minimal disc bulging. There is moderate bilateral facet/ligamentous hypertrophy. There is no significant central canal, subarticular, or foraminal zone stenosis.\par         L2-3: There is no significant disc bulge or herniation. There is moderate bilateral facet/ligamentous hypertrophy. There is no significant central canal, subarticular, or foraminal zone stenosis.\par         L3-4: There is a moderate diffuse disc bulge. There is moderate bilateral facet/ligamentous hypertrophy. There is no significant central canal or subarticular zone stenosis. There is mild left foraminal stenosis and no significant right foraminal stenosis.\par         L4-5: There is a small central disc protrusion. There is moderate bilateral facet/ligamentous hypertrophy. There is no significant central canal stenosis. There is mild bilateral subarticular zone stenosis. There is mild bilateral foraminal stenosis.\par         L5-S1: There is a left paracentral disc extrusion with mild inferiorly oriented subligamentous extension superimposed on mild diffuse disc bulge. There is moderate bilateral facet hypertrophy. There is no significant central canal stenosis. There is moderate/severe left subarticular zone stenosis with encroachment upon the left S1 nerve root. There is no significant right subarticular zone stenosis. There is moderate right foraminal stenosis and no significant left foraminal stenosis.\par \par IMPRESSION:\par         Acute/subacute compression fracture of the superior endplate of L1 with no significant retropulsion or central canal stenosis. Acute/subacute compression fracture of the superior endplate of L2 with minimal retropulsion and no significant underlying central canal stenosis. Edema in the partially imaged S2 on S3 vertebra suggest an additional sacral fracture.\par         At L5-S1, a left paracentral disc herniation encroaches upon the left S1 nerve root.\par         Lumbar scoliosis and spondylosis. No significant central canal stenosis. Foraminal stenosis most pronounced at L5-S1 on the right.\par         \par \par  [FreeTextEntry2] : 9

## 2022-08-26 ENCOUNTER — RESULT REVIEW (OUTPATIENT)
Age: 57
End: 2022-08-26

## 2022-09-02 ENCOUNTER — APPOINTMENT (OUTPATIENT)
Dept: PAIN MANAGEMENT | Facility: CLINIC | Age: 57
End: 2022-09-02

## 2022-09-02 VITALS
HEIGHT: 70 IN | HEART RATE: 73 BPM | SYSTOLIC BLOOD PRESSURE: 123 MMHG | BODY MASS INDEX: 22.9 KG/M2 | DIASTOLIC BLOOD PRESSURE: 82 MMHG | WEIGHT: 160 LBS | OXYGEN SATURATION: 96 %

## 2022-09-02 DIAGNOSIS — Z79.01 LONG TERM (CURRENT) USE OF ANTICOAGULANTS: ICD-10-CM

## 2022-09-02 PROCEDURE — 99214 OFFICE O/P EST MOD 30 MIN: CPT

## 2022-09-02 NOTE — ASSESSMENT
[FreeTextEntry1] : >> Imaging and Other Studies\par \par I personally reviewed the relevant imaging.  Discussed and explained to patient the likely source of pathology and pain.  Questions answered. Xr\par \par I personally reviewed the relevant imaging.  Discussed and explained to patient the likely source of pathology and pain.  Questions answered. MRI\par \par >> Therapy and Other Modalities\par \par restart PT\par \par >> Medications\par  \par Regarding opiate medication to manage pain. I had a detailed discussion with the patient regarding the risks of long-term opioid use, including the potential for medication side effects, hyperaglesia, endocrine dysfunction,  \par \par Severe lower back pain with axial movement.  Pain is refractory to conservative treatment including tylenol and exercises.  Patient unable to move because of the pain and unable to perform adls.  Because of progressive deterioration secondary to severe pain from vertebral compression fracture with edema demonstrated on imaging, sp T8 vertebral kyphoplasty \par \par \par Compression fracture of lumbar vertebra, non-traumatic, sequela \par compression fracture of L1 and L2 s/p kyphoplasty with significant improvement in pain and function. \par now with chronic L3 vcf \par \par gabapentin 100mg nightly\par acetaminophen 650mg q8h prn pain (caution <3g daily)\par \par Regarding opiate medication to manage pain. I had a detailed discussion with the patient regarding the risks of long-term opioid use, including the potential for medication side effects, hyperaglesia, endocrine dysfunction,  Encouraged weaning with assistance of current prescriber.\par minimize tramadol use\par \par >> Interventions\par \par Significant component of axial back pain secondary to lumbar spondylosis and facet arthropathy demonstrated on MRI LS.  Pain refractory to conservative treatments.  s/p BILATERAL L3-sacral ala diagnostic medial branch block with significant improvement\par \par Given significant relief from diagnostic lumbar medial branch block of >80%, and continued lumbar facet arthropathy pain and axial back pain, s/p LEFT AND RIGHT  L3-sacral ala medial branch radiofrequency ablation with improvement in axial back pain\par \par Significant component of axial mid back pain secondary to thoracic spondylosis and facet arthropathy demonstrated on MRI LS.  Pain refractory to conservative treatments.  Will schedule BILATERAL THORACIC DIAGNOSTIC medial branch block (2 joints, 3 nerves on each side) r/b/a discussed\par \par patient will consult with prescriber regarding holding coumadin 5 days prior to procedure, check INR day of procedure\par \par \par May consider radiofreqency ablation\par \par >> Consults\par \par referral endocrinologist for osteoporosis\par \par >> Discussion of Risks/Benefits/Alternatives\par \par 	>Regarding any scheduled procedures:\par \par I have discussed in detail with the patient that any interventional pain procedure is associated with potential risks.  The procedure may include an injection of steroids and potentially other medications (local anesthetic and normal saline) into the epidural space or surrounding tissue of the spine.  There are significant risks of this procedure which include and are not limited to infection, bleeding, worsening pain, dural puncture leading to postdural puncture headache, nerve damage, spinal cord injury, paralysis, stroke, and death.  \par \par There is a chance that the procedure does not improve their pain.  \par \par There are risks associated with the steroid being absorbed into the body systemically.  These include dysphoria, difficulty sleeping, mood swings and personality changes.  Premenopausal women may notice an irregularity in her menstrual cycle for 2-3 months following the injection.  Steroids can specifically affect patients with hypertension, diabetes, and peptic ulcers.  The procedure may cause a temporary increase in blood pressure and blood pressure, and may adversely affect a peptic ulcer.  Other, more rare complications, include avascular necrosis of joints, glaucoma and worsening of osteoporosis. \par \par I have discussed the risks of the procedure at length with the patient, and the potential benefits of pain relief.  I have offered alternatives to the procedure.  All questions were answered.  \par \par The patient expressed understanding and wishes to proceed with the procedure.\par \par 	>Regarding COVID19 Pandemic: \par \par Any planned interventional pain procedure are scheduled because further delay may cause harm or negative outcome to patient.  The goal in performing this procedure is to avoid deterioration of function, emergency room visits (which increases exposure) and reliance on opioids.  \par \par r/b/a discussed with patient, lack of evidence to conclusively determine whether pain management procedures have any positive or negative impact on the possibility of tracey the virus and/or development of any sequelae. \par \par Patient counselled regarding timing steroid based intervention 2 weeks before or after COVID-19 vaccine administration to avoid any interaction or affect on efficacy of vaccination\par \par Patient demonstrates understanding\par \par Informed patient that risks associated with the COVID-19 infection.  Informed patient steps taken to limit the risks.  We are implementing safety precautions and following protocols consistent with the CDC and state recommendations. All patients and staff will be checked for fever or signs of illness upon entry to the facility. We will limit our steroid dose to the lowest effective therapeutic dose or in some cases steroids will not be injected at all. \par \par Patient agrees to proceed\par \par >> Conclusion\par \par The above diagnosis and treatment plan is medically reasonable and necessary based on the patient encounter \par There were no barriers to communication.\par Informed patient that I would be available for any additional questions.\par Patient was instructed to call with any worsening symptoms including severe pain, new numbness/weakness, or changes in the bowel/bladder function. \par Discussed role of nsaids in pain management and all relevant risks, if patient is continuing to require after 4 weeks the patient should f/u for alternative treatment. \par Instructed patient to maintain pain diary to monitor pain level, mobility, and function.\par \par \par \par \par \par

## 2022-09-02 NOTE — HISTORY OF PRESENT ILLNESS
[10] : a maximum pain level of 10/10 [FreeTextEntry1] : Interval Note:\par \par Patient reports that she has bilateral mid back pain about a month ago.  She reports that the pain is worse with shifting in bed.  Pain is so bad that patient finds it difficult to perform adls and ambulate. Taking Tylenol #3 for the pain.  Continues osteoporosis treatment, on vitamin D\par \par  Denies any additional weakness, numbness, bowel/bladder dysfunction.  \par \par \par HPI\par \par Ms. 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 Suh, hypertension, seizure disorder who sustained a fall in the beginning of 2/2017 and admitted to [facility] for evaluation and treatment of PNA s/p abx therapy upon discharge at [facility] in early 2/2017. As per patient she was at [facility] when she fell from her walker about 1 week ago and felt her back twist. Admitted at [facility] 2/19/17 for lowre back pain found o have acute L1 and L2 compression fracture. Patient is s/p L1 L2 kyphoplasty by me 2/20/17. Discharged to [facility]acute re[facility] where patient is currently. Lower back pain has significantly improved since kyphoplasty. Patient is able to participate in PT. Denies any additional weakness, numbness or fever/chills. Incision is well healed, nonerythematous. Patient does complain of axial/mechanical bilateral lower back pain for many years without any inciting event. Nonradiating, described as aching. Patient takes tramadol 50mg BID for this pain but demonstrates desire to wean secondary to history of seizures.\par        Patient presents with  bilateral lower back pain. The pain has been occuring for many years. The pain frequency is constant with exacerbations. The character of the pain is described as aching. The current pain intensity is 7/10. With activity, the pain intensity increases to  10/10. The pain increases with activity. The pain is decreased by rest, medications. Patient reports that perfoming activities of daily living difficult. \par    History:  \par        Previous nerve block or injection \par \par T8 kyphoplasty 3/3/22\par LEFT AND RIGHT  L3-sacral ala medial branch radiofrequency ablation 10/20/19\par BILATERAL L3-sacral ala diagnostic medial branch block 9/5/19\par         B/L L3-sacral ryan mbb RFA by me 5/16/17 with 100% relief of lower back pain\par         \par         Bilateral L3-sacral ryan mbb by me 3/16/17 with 90% relief of back pain for 15 hours\par         \par         Bilateral L3-sacral ryan mbb by me 4/13/17 with 50% relief of back pain for 8 hours\par         \par         L1 L2 kyphoplasty by me 2/20/17\par \par Imaging studies  \par \par MRI TS 8/22\par \par  There is no new/acute fracture. There is interval kyphoplasty of a previously identified T8\par compression deformity. Vertebral body heights are otherwise maintained.\par \par  There is normal thoracic kyphosis. No subluxation. No prevertebral soft tissue swelling.\par \par  The thoracic cord is normal in size and signal characteristics.\par \par  There is no significant disc pathology within the thoracic spine. No areas of central canal or\par neural foraminal narrowing.\par \par  No significant change in left adrenal adenoma.\par \par \par \par  IMPRESSION:\par \par  There is no new/acute fracture. There is interval kyphoplasty of a previously identified T8\par compression deformity. Vertebral body heights are otherwise maintained.\par \par  No disc herniation, central canal or neural foraminal narrowing.\par \par MRI LS 1/22\par \par Comparison is made to a prior MRI of the lumbar spine performed on 7/9/2019.\par \par  There is normal curvature to the lumbar lordosis. There is mild dextroscoliosis of the lower\par thoracic and upper lumbar spine with apex at L1/L2, unchanged. There are mild chronic compression\par fractures of the L1, L2 and L3 vertebral bodies. The patient is status post kyphoplasty of L1 and\par L2. The remaining vertebral bodies are normal height and configuration. The intervertebral disc\par demonstrate moderate loss of disc height and T2 signal at the L3/L4 and L5/S1 and mild loss at L4/L5\par disc spaces with anterior and minimal posterior osteophyte formation consistent with desiccation and\par degenerative changes. The conus terminates at the L1 level and demonstrates no evidence of abnormal\par signal changes.\par \par  Evaluation of the individual levels demonstrates at the L5/S1 level there is a mild diffuse disc\par bulge and right-sided osteophyte mildly compressing the distal right L5 foraminal exiting nerve\par root. There is a central/left paracentral protrusion flattening the ventral thecal sac and minimally\par contacting the right descending S1 nerve root and compressing and mildly posteriorly displacing the\par left descending S1 nerve root, unchanged. There is moderate to severe right and moderate left\par foraminal narrowing. There is moderate facet and ligamentous hypertrophy. There is left lateral\par recess narrowing, unchanged. There is no evidence of spinal canal stenosis.\par \par  At the L4/L5 level there is a minimal disc bulge contacting the ventral thecal sac, unchanged.\par There is mild bilateral foraminal narrowing. There is mild to moderate facet and ligamentous\par hypertrophy. There is no evidence of spinal canal stenosis.\par \par  At the L3/L4 level there is a mild diffuse disc bulge flattening the ventral thecal sac, unchanged.\par There is minimal right and mild left foraminal narrowing. There is mild facet and ligamentous\par hypertrophy. There is no evidence of spinal canal stenosis.\par \par  At the L2/L3 level there is a minimal diffuse disc bulge contacting the ventral thecal sac,\par unchanged. The bilateral neuroforamen are patent. There is mild facet degenerative changes. There is\par no evidence of spinal canal stenosis.\par \par  At the L1/L2 and T12/L1 levels there are no evidence of focal disc herniation or spinal canal\par stenosis.\par \par \par  Impression:\par \par  Mild chronic compression fractures of the L1, L2 and L3 vertebral bodies, unchanged. Status post\par post kyphoplasty of L1 and L2, unchanged.\par \par  Degenerative changes of the lumbar spine was notable at L5/S1\par \par  L5/S1  mild diffuse disc bulge and right-sided osteophyte mildly compressing the distal right L5\par foraminal exiting nerve root, unchanged. There is a central/left paracentral protrusion flattening\par the ventral thecal sac and minimally contacting the right descending S1 nerve root and compressing\par and mildly posteriorly displacing the left descending S1 nerve root, unchanged. L5/S1 no evidence of\par spinal canal stenosis.\par \par MRI TS 1/22\par \par  MRI of the thoracic spine was performed utilizing sagittal  T1, axial and sagittal T2-\par weighted and axial gradient echo images as well as sagittal STIR images.\par \par  There are no prior studies available for comparison.\par \par  Findings: There is moderate loss of height of the T8 vertebral body with a small band of STIR\par signal hyperintensity seen at the superior endplate; findings are consistent with an acute to\par subacute moderate T8 compression fracture. There is no evidence of retropulsion of fracture\par fragments.\par \par  There is kyphosis of the thoracic spine. There is a Schmorl's node seen at the superior endplate of\par T2 inferior endplate of T8. The remaining vertebral bodies are of normal height and configuration.\par The intervertebral discs spaces demonstrate mild loss of disc height and T2 signal within the T1/T2,\par T2/T3, T5/T6, T6/T7, T7/T8 and T8/T9 consistent with desiccation. There is an approximately 0.6 cm\par focus of T1, T2 and STIR signal marked hypointensity that may represent a small bone island at the\par superior endplate of T9. Evaluation of the spinal cord demonstrates no evidence of abnormal signal\par changes.\par \par  Evaluation of the individual levels demonstrates no evidence of a focal disc herniation or spinal\par cord compression.\par \par  There is an approximately 1.2 cm rounded soft tissue nodule along the left adrenal gland; for\par further evaluation, would recommend CT or MRI utilizing adrenal gland protocol.\par \par \par  Impression:\par \par  Moderate acute to subacute compression fracture of the superior endplate of T8.\par \par  No evidence of focal disc herniation or spinal cord compression.\par \par \par  Approximately 1.2 cm rounded soft tissue nodule along the left adrenal gland; for further\par evaluation, would recommend CT or MRI utilizing adrenal gland protocol.\par \par \par MRI LS 7/2019\par \par There is a mild levoscoliosis of the lower lumbar spine. A levoscoliosis of the visualized thoracic  \par spine is noted. The lumbar lordosis and alignment is grossly maintained. The marrow signal is   \par overall within normal limits with no focal marrow replacing lesion identified.  \par  \par  There has been interval kyphoplasty of previously seen L1 and L2 compression fractures. There is   \par mild loss of height of the vertebral bodies with no significant retropulsion or underlying central   \par canal stenosis. There is additionally a mild chronic compression fracture of the superior endplate   \par which has developed in the interval. There is no associated vertebral edema. No acute fracture is   \par identified. There is mild degenerative endplate edema at L5-S1. Otherwise no abnormal vertebral or   \par paraspinal edema is appreciated. The visualized paraspinal soft tissues appear grossly unremarkable.  \par  \par  The conus medullaris terminates at L2 and the thecal sac terminates at S2. No abnormal signal is   \par identified in the visualized spinal cord.  \par  \par  L1-2: Mild disc bulge and mild ligamentous hypertrophy. No significant central canal, subarticular,  \par or foraminal stenosis. No significant interval change.  \par  \par  L2-3: Mild disc bulge and mild ligamentous hypertrophy. No significant central canal, subarticular,  \par or foraminal stenosis. Mild increase in disc bulging associated with the compression of superior   \par endplate of L3. No significant increase in stenosis.  \par  \par  L3-4: Mild disc bulge. Mild facet/ligamentous hypertrophy. No significant central canal or   \par subarticular stenosis. Mild/moderate left foraminal stenosis and no significant right foraminal   \par stenosis. No significant interval change.  \par  \par  L4-5: Minimal disc bulge and mild facet/ligamentous hypertrophy. No significant central canal or   \par subarticular stenosis. Mild bilateral foraminal stenosis. Previously seen central disc protrusion is  \par no longer appreciated. Otherwise no significant interval change.  \par  \par  L5-S1: Small left paracentral disc extrusion with inferiorly oriented extension superimposed on a   \par mild disc bulge. No significant central canal stenosis. Moderate left subarticular stenosis with   \par encroachment upon the left S1 nerve root. No significant right subarticular stenosis. Moderate right  \par foraminal stenosis and no significant left foraminal stenosis. No significant interval change.  \par  \par  \par  \par  IMPRESSION:  \par  \par  No acute fracture. Chronic compression fractures of L1, L2, and L3, with kyphoplasty cement in L1   \par and L2. Chronic L3 compression fracture has developed since 2/20/2017.  \par  \par  Lumbar spondylosis. Left paracentral disc herniation at L5-S1 encroaches upon the left S1 nerve   \par root. No significant central canal stenosis. Foraminal stenosis as above, most pronounced at L5-S1   \par the right. Overall no significant change in stenosis when compared to 2/20/2017.  \par \par         MRI LS 2/20/17\par \par There is a moderate dextroscoliosis of the lower thoracic and upper lumbar spine with apex at the approximate L1-2 level. The normal lumbar lordosis and alignment is maintained. The marrow signal is overall within normal limits with no focal marrow replacing lesion identified.\par         There is a mild compression deformity of the superior endplate of L1 with no significant retropulsion or underlying central canal stenosis. There is a mild compression deformity of the superior endplate of L2 with minimal retropulsion and no significant underlying central canal stenosis. There is edema associated with both these fractures consistent with acute to subacute fractures. In the visualized sacrum, there is edema at the S2 and S3 vertebral bodies which is nonspecific, possibly reflecting an additional sacral fracture. No definite cortical break was seen in this region on recent CT of the lumbar spine.\par         The visualized paraspinal soft tissues appear grossly unremarkable. The conus medullaris terminates at L2 and the thecal sac terminates at S1-2. No abnormal signal is identified in the visualized spinal cord.\par         T12-L1: There is no significant disc bulge or herniation. There is bilateral facet hypertrophy. There is no significant central canal, subarticular, or foraminal stenosis.\par         L1-2: There is minimal retropulsion of the superior endplate of L2. There is minimal disc bulging. There is moderate bilateral facet/ligamentous hypertrophy. There is no significant central canal, subarticular, or foraminal zone stenosis.\par         L2-3: There is no significant disc bulge or herniation. There is moderate bilateral facet/ligamentous hypertrophy. There is no significant central canal, subarticular, or foraminal zone stenosis.\par         L3-4: There is a moderate diffuse disc bulge. There is moderate bilateral facet/ligamentous hypertrophy. There is no significant central canal or subarticular zone stenosis. There is mild left foraminal stenosis and no significant right foraminal stenosis.\par         L4-5: There is a small central disc protrusion. There is moderate bilateral facet/ligamentous hypertrophy. There is no significant central canal stenosis. There is mild bilateral subarticular zone stenosis. There is mild bilateral foraminal stenosis.\par         L5-S1: There is a left paracentral disc extrusion with mild inferiorly oriented subligamentous extension superimposed on mild diffuse disc bulge. There is moderate bilateral facet hypertrophy. There is no significant central canal stenosis. There is moderate/severe left subarticular zone stenosis with encroachment upon the left S1 nerve root. There is no significant right subarticular zone stenosis. There is moderate right foraminal stenosis and no significant left foraminal stenosis.\par \par IMPRESSION:\par         Acute/subacute compression fracture of the superior endplate of L1 with no significant retropulsion or central canal stenosis. Acute/subacute compression fracture of the superior endplate of L2 with minimal retropulsion and no significant underlying central canal stenosis. Edema in the partially imaged S2 on S3 vertebra suggest an additional sacral fracture.\par         At L5-S1, a left paracentral disc herniation encroaches upon the left S1 nerve root.\par         Lumbar scoliosis and spondylosis. No significant central canal stenosis. Foraminal stenosis most pronounced at L5-S1 on the right.\par         \par \par

## 2022-09-02 NOTE — PHYSICAL EXAM
[Normal Spine curvature] : normal spine curvature [Facet Tenderness] : facet tenderness [Spine: Flexion to ___ degrees, without pain] : spine: flexion to [unfilled] degrees, without pain [Wheelchair] : uses a wheelchair [Normal] : Normal affect

## 2022-09-14 ENCOUNTER — RESULT REVIEW (OUTPATIENT)
Age: 57
End: 2022-09-14

## 2022-09-14 ENCOUNTER — TRANSCRIPTION ENCOUNTER (OUTPATIENT)
Age: 57
End: 2022-09-14

## 2022-09-14 ENCOUNTER — APPOINTMENT (OUTPATIENT)
Dept: PAIN MANAGEMENT | Facility: HOSPITAL | Age: 57
End: 2022-09-14

## 2022-09-30 ENCOUNTER — APPOINTMENT (OUTPATIENT)
Dept: PAIN MANAGEMENT | Facility: CLINIC | Age: 57
End: 2022-09-30

## 2022-09-30 VITALS
WEIGHT: 160 LBS | SYSTOLIC BLOOD PRESSURE: 108 MMHG | BODY MASS INDEX: 22.9 KG/M2 | HEIGHT: 70 IN | TEMPERATURE: 98 F | DIASTOLIC BLOOD PRESSURE: 73 MMHG

## 2022-09-30 PROCEDURE — 99214 OFFICE O/P EST MOD 30 MIN: CPT

## 2022-09-30 NOTE — PHYSICAL EXAM
[Normal] : Well developed, in no acute distress, alert and oriented to person, place and time [Normal muscle bulk without asymmetry] : normal muscle bulk without asymmetry [Facet Tenderness] : no facet tenderness [Wheelchair] : uses a wheelchair

## 2022-09-30 NOTE — HISTORY OF PRESENT ILLNESS
[2] : 3. What number best describes how, during the past week, pain has interfered with your general activity? 2/10 pain [10] : a maximum pain level of 10/10 [FreeTextEntry1] : Interval Note:\par sp RIGHT THORACIC DIAGNOSTIC T10, 11, 12 medial branch block  with sustained 100% improvement in right back pain.  Not taking prn pain medications.  Patient reports that she feels well but needs new endocrinologist to assist with osteoporosis care as well as GI for routine colonoscopy\par \par  Denies any additional weakness, numbness, bowel/bladder dysfunction.  \par \par \par HPI\par \par Ms. 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 Suh, hypertension, seizure disorder who sustained a fall in the beginning of 2/2017 and admitted to [facility] for evaluation and treatment of PNA s/p abx therapy upon discharge at [facility] in early 2/2017. As per patient she was at [facility] when she fell from her walker about 1 week ago and felt her back twist. Admitted at [facility] 2/19/17 for lowre back pain found o have acute L1 and L2 compression fracture. Patient is s/p L1 L2 kyphoplasty by me 2/20/17. Discharged to [facility] subacute rehab where patient is currently. Lower back pain has significantly improved since kyphoplasty. Patient is able to participate in PT. Denies any additional weakness, numbness or fever/chills. Incision is well healed, nonerythematous. Patient does complain of axial/mechanical bilateral lower back pain for many years without any inciting event. Nonradiating, described as aching. Patient takes tramadol 50mg BID for this pain but demonstrates desire to wean secondary to history of seizures.\par        Patient presents with  bilateral lower back pain. The pain has been occuring for many years. The pain frequency is constant with exacerbations. The character of the pain is described as aching. The current pain intensity is 7/10. With activity, the pain intensity increases to  10/10. The pain increases with activity. The pain is decreased by rest, medications. Patient reports that perfoming activities of daily living difficult. \par    History:  \par        Previous nerve block or injection \par  RIGHT THORACIC DIAGNOSTIC T10, 11, 12 medial branch block (2 joints, 3 nerves on each side)\par T8 kyphoplasty 3/3/22\par LEFT AND RIGHT  L3-sacral ala medial branch radiofrequency ablation 10/20/19\par BILATERAL L3-sacral ala diagnostic medial branch block 9/5/19\par         B/L L3-sacral ryan mbb RFA by me 5/16/17 with 100% relief of lower back pain\par         \par         Bilateral L3-sacral ryan mbb by me 3/16/17 with 90% relief of back pain for 15 hours\par         \par         Bilateral L3-sacral ryan mbb by me 4/13/17 with 50% relief of back pain for 8 hours\par         \par         L1 L2 kyphoplasty by me 2/20/17\par \par Imaging studies  \par \par MRI TS 8/22\par \par  There is no new/acute fracture. There is interval kyphoplasty of a previously identified T8\par compression deformity. Vertebral body heights are otherwise maintained.\par \par  There is normal thoracic kyphosis. No subluxation. No prevertebral soft tissue swelling.\par \par  The thoracic cord is normal in size and signal characteristics.\par \par  There is no significant disc pathology within the thoracic spine. No areas of central canal or\par neural foraminal narrowing.\par \par  No significant change in left adrenal adenoma.\par \par \par \par  IMPRESSION:\par \par  There is no new/acute fracture. There is interval kyphoplasty of a previously identified T8\par compression deformity. Vertebral body heights are otherwise maintained.\par \par  No disc herniation, central canal or neural foraminal narrowing.\par \par MRI LS 1/22\par \par Comparison is made to a prior MRI of the lumbar spine performed on 7/9/2019.\par \par  There is normal curvature to the lumbar lordosis. There is mild dextroscoliosis of the lower\par thoracic and upper lumbar spine with apex at L1/L2, unchanged. There are mild chronic compression\par fractures of the L1, L2 and L3 vertebral bodies. The patient is status post kyphoplasty of L1 and\par L2. The remaining vertebral bodies are normal height and configuration. The intervertebral disc\par demonstrate moderate loss of disc height and T2 signal at the L3/L4 and L5/S1 and mild loss at L4/L5\par disc spaces with anterior and minimal posterior osteophyte formation consistent with desiccation and\par degenerative changes. The conus terminates at the L1 level and demonstrates no evidence of abnormal\par signal changes.\par \par  Evaluation of the individual levels demonstrates at the L5/S1 level there is a mild diffuse disc\par bulge and right-sided osteophyte mildly compressing the distal right L5 foraminal exiting nerve\par root. There is a central/left paracentral protrusion flattening the ventral thecal sac and minimally\par contacting the right descending S1 nerve root and compressing and mildly posteriorly displacing the\par left descending S1 nerve root, unchanged. There is moderate to severe right and moderate left\par foraminal narrowing. There is moderate facet and ligamentous hypertrophy. There is left lateral\par recess narrowing, unchanged. There is no evidence of spinal canal stenosis.\par \par  At the L4/L5 level there is a minimal disc bulge contacting the ventral thecal sac, unchanged.\par There is mild bilateral foraminal narrowing. There is mild to moderate facet and ligamentous\par hypertrophy. There is no evidence of spinal canal stenosis.\par \par  At the L3/L4 level there is a mild diffuse disc bulge flattening the ventral thecal sac, unchanged.\par There is minimal right and mild left foraminal narrowing. There is mild facet and ligamentous\par hypertrophy. There is no evidence of spinal canal stenosis.\par \par  At the L2/L3 level there is a minimal diffuse disc bulge contacting the ventral thecal sac,\par unchanged. The bilateral neuroforamen are patent. There is mild facet degenerative changes. There is\par no evidence of spinal canal stenosis.\par \par  At the L1/L2 and T12/L1 levels there are no evidence of focal disc herniation or spinal canal\par stenosis.\par \par \par  Impression:\par \par  Mild chronic compression fractures of the L1, L2 and L3 vertebral bodies, unchanged. Status post\par post kyphoplasty of L1 and L2, unchanged.\par \par  Degenerative changes of the lumbar spine was notable at L5/S1\par \par  L5/S1  mild diffuse disc bulge and right-sided osteophyte mildly compressing the distal right L5\par foraminal exiting nerve root, unchanged. There is a central/left paracentral protrusion flattening\par the ventral thecal sac and minimally contacting the right descending S1 nerve root and compressing\par and mildly posteriorly displacing the left descending S1 nerve root, unchanged. L5/S1 no evidence of\par spinal canal stenosis.\par \par MRI TS 1/22\par \par  MRI of the thoracic spine was performed utilizing sagittal  T1, axial and sagittal T2-\par weighted and axial gradient echo images as well as sagittal STIR images.\par \par  There are no prior studies available for comparison.\par \par  Findings: There is moderate loss of height of the T8 vertebral body with a small band of STIR\par signal hyperintensity seen at the superior endplate; findings are consistent with an acute to\par subacute moderate T8 compression fracture. There is no evidence of retropulsion of fracture\par fragments.\par \par  There is kyphosis of the thoracic spine. There is a Schmorl's node seen at the superior endplate of\par T2 inferior endplate of T8. The remaining vertebral bodies are of normal height and configuration.\par The intervertebral discs spaces demonstrate mild loss of disc height and T2 signal within the T1/T2,\par T2/T3, T5/T6, T6/T7, T7/T8 and T8/T9 consistent with desiccation. There is an approximately 0.6 cm\par focus of T1, T2 and STIR signal marked hypointensity that may represent a small bone island at the\par superior endplate of T9. Evaluation of the spinal cord demonstrates no evidence of abnormal signal\par changes.\par \par  Evaluation of the individual levels demonstrates no evidence of a focal disc herniation or spinal\par cord compression.\par \par  There is an approximately 1.2 cm rounded soft tissue nodule along the left adrenal gland; for\par further evaluation, would recommend CT or MRI utilizing adrenal gland protocol.\par \par \par  Impression:\par \par  Moderate acute to subacute compression fracture of the superior endplate of T8.\par \par  No evidence of focal disc herniation or spinal cord compression.\par \par \par  Approximately 1.2 cm rounded soft tissue nodule along the left adrenal gland; for further\par evaluation, would recommend CT or MRI utilizing adrenal gland protocol.\par \par \par MRI LS 7/2019\par \par There is a mild levoscoliosis of the lower lumbar spine. A levoscoliosis of the visualized thoracic  \par spine is noted. The lumbar lordosis and alignment is grossly maintained. The marrow signal is   \par overall within normal limits with no focal marrow replacing lesion identified.  \par  \par  There has been interval kyphoplasty of previously seen L1 and L2 compression fractures. There is   \par mild loss of height of the vertebral bodies with no significant retropulsion or underlying central   \par canal stenosis. There is additionally a mild chronic compression fracture of the superior endplate   \par which has developed in the interval. There is no associated vertebral edema. No acute fracture is   \par identified. There is mild degenerative endplate edema at L5-S1. Otherwise no abnormal vertebral or   \par paraspinal edema is appreciated. The visualized paraspinal soft tissues appear grossly unremarkable.  \par  \par  The conus medullaris terminates at L2 and the thecal sac terminates at S2. No abnormal signal is   \par identified in the visualized spinal cord.  \par  \par  L1-2: Mild disc bulge and mild ligamentous hypertrophy. No significant central canal, subarticular,  \par or foraminal stenosis. No significant interval change.  \par  \par  L2-3: Mild disc bulge and mild ligamentous hypertrophy. No significant central canal, subarticular,  \par or foraminal stenosis. Mild increase in disc bulging associated with the compression of superior   \par endplate of L3. No significant increase in stenosis.  \par  \par  L3-4: Mild disc bulge. Mild facet/ligamentous hypertrophy. No significant central canal or   \par subarticular stenosis. Mild/moderate left foraminal stenosis and no significant right foraminal   \par stenosis. No significant interval change.  \par  \par  L4-5: Minimal disc bulge and mild facet/ligamentous hypertrophy. No significant central canal or   \par subarticular stenosis. Mild bilateral foraminal stenosis. Previously seen central disc protrusion is  \par no longer appreciated. Otherwise no significant interval change.  \par  \par  L5-S1: Small left paracentral disc extrusion with inferiorly oriented extension superimposed on a   \par mild disc bulge. No significant central canal stenosis. Moderate left subarticular stenosis with   \par encroachment upon the left S1 nerve root. No significant right subarticular stenosis. Moderate right  \par foraminal stenosis and no significant left foraminal stenosis. No significant interval change.  \par  \par  \par  \par  IMPRESSION:  \par  \par  No acute fracture. Chronic compression fractures of L1, L2, and L3, with kyphoplasty cement in L1   \par and L2. Chronic L3 compression fracture has developed since 2/20/2017.  \par  \par  Lumbar spondylosis. Left paracentral disc herniation at L5-S1 encroaches upon the left S1 nerve   \par root. No significant central canal stenosis. Foraminal stenosis as above, most pronounced at L5-S1   \par the right. Overall no significant change in stenosis when compared to 2/20/2017.  \par \par         MRI LS 2/20/17\par \par There is a moderate dextroscoliosis of the lower thoracic and upper lumbar spine with apex at the approximate L1-2 level. The normal lumbar lordosis and alignment is maintained. The marrow signal is overall within normal limits with no focal marrow replacing lesion identified.\par         There is a mild compression deformity of the superior endplate of L1 with no significant retropulsion or underlying central canal stenosis. There is a mild compression deformity of the superior endplate of L2 with minimal retropulsion and no significant underlying central canal stenosis. There is edema associated with both these fractures consistent with acute to subacute fractures. In the visualized sacrum, there is edema at the S2 and S3 vertebral bodies which is nonspecific, possibly reflecting an additional sacral fracture. No definite cortical break was seen in this region on recent CT of the lumbar spine.\par         The visualized paraspinal soft tissues appear grossly unremarkable. The conus medullaris terminates at L2 and the thecal sac terminates at S1-2. No abnormal signal is identified in the visualized spinal cord.\par         T12-L1: There is no significant disc bulge or herniation. There is bilateral facet hypertrophy. There is no significant central canal, subarticular, or foraminal stenosis.\par         L1-2: There is minimal retropulsion of the superior endplate of L2. There is minimal disc bulging. There is moderate bilateral facet/ligamentous hypertrophy. There is no significant central canal, subarticular, or foraminal zone stenosis.\par         L2-3: There is no significant disc bulge or herniation. There is moderate bilateral facet/ligamentous hypertrophy. There is no significant central canal, subarticular, or foraminal zone stenosis.\par         L3-4: There is a moderate diffuse disc bulge. There is moderate bilateral facet/ligamentous hypertrophy. There is no significant central canal or subarticular zone stenosis. There is mild left foraminal stenosis and no significant right foraminal stenosis.\par         L4-5: There is a small central disc protrusion. There is moderate bilateral facet/ligamentous hypertrophy. There is no significant central canal stenosis. There is mild bilateral subarticular zone stenosis. There is mild bilateral foraminal stenosis.\par         L5-S1: There is a left paracentral disc extrusion with mild inferiorly oriented subligamentous extension superimposed on mild diffuse disc bulge. There is moderate bilateral facet hypertrophy. There is no significant central canal stenosis. There is moderate/severe left subarticular zone stenosis with encroachment upon the left S1 nerve root. There is no significant right subarticular zone stenosis. There is moderate right foraminal stenosis and no significant left foraminal stenosis.\par \par IMPRESSION:\par         Acute/subacute compression fracture of the superior endplate of L1 with no significant retropulsion or central canal stenosis. Acute/subacute compression fracture of the superior endplate of L2 with minimal retropulsion and no significant underlying central canal stenosis. Edema in the partially imaged S2 on S3 vertebra suggest an additional sacral fracture.\par         At L5-S1, a left paracentral disc herniation encroaches upon the left S1 nerve root.\par         Lumbar scoliosis and spondylosis. No significant central canal stenosis. Foraminal stenosis most pronounced at L5-S1 on the right.\par         \par \par  [FreeTextEntry2] : 6

## 2022-09-30 NOTE — ASSESSMENT
[FreeTextEntry1] : >> Imaging and Other Studies\par \par I personally reviewed the relevant imaging.  Discussed and explained to patient the likely source of pathology and pain.  Questions answered. Xr\par \par I personally reviewed the relevant imaging.  Discussed and explained to patient the likely source of pathology and pain.  Questions answered. MRI\par \par >> Therapy and Other Modalities\par \par continue PT\par \par >> Medications\par  \par Regarding opiate medication to manage pain. I had a detailed discussion with the patient regarding the risks of long-term opioid use, including the potential for medication side effects, hyperaglesia, endocrine dysfunction,  \par \par Severe lower back pain with axial movement.  Pain is refractory to conservative treatment including tylenol and exercises.  Patient unable to move because of the pain and unable to perform adls.  Because of progressive deterioration secondary to severe pain from vertebral compression fracture with edema demonstrated on imaging, sp T8 vertebral kyphoplasty \par \par \par Compression fracture of lumbar vertebra, non-traumatic, sequela \par compression fracture of L1 and L2 s/p kyphoplasty with significant improvement in pain and function. \par now with chronic L3 vcf \par \par gabapentin 100mg nightly\par acetaminophen 650mg q8h prn pain (caution <3g daily)\par \par Regarding opiate medication to manage pain. I had a detailed discussion with the patient regarding the risks of long-term opioid use, including the potential for medication side effects, hyperaglesia, endocrine dysfunction,  Encouraged weaning with assistance of current prescriber.\par minimize tramadol use\par \par >> Interventions\par \par Significant component of axial back pain secondary to lumbar spondylosis and facet arthropathy demonstrated on MRI LS.  Pain refractory to conservative treatments.  s/p BILATERAL L3-sacral ala diagnostic medial branch block with significant improvement\par \par Given significant relief from diagnostic lumbar medial branch block of >80%, and continued lumbar facet arthropathy pain and axial back pain, s/p LEFT AND RIGHT  L3-sacral ala medial branch radiofrequency ablation with improvement in axial back pain\par \par Significant component of axial mid back pain secondary to thoracic spondylosis and facet arthropathy demonstrated on MRI LS.  Pain refractory to conservative treatments.  Will schedule RIGHT THORACIC DIAGNOSTIC medial branch block T10, 11, 12 (2 joints, 3 nerves on each side) with significant improvement\par \par \par May consider radiofreqency ablation\par \par >> Consults\par \par referral endocrinologist for osteoporosis\par \par >> Discussion of Risks/Benefits/Alternatives\par \par 	>Regarding any scheduled procedures:\par \par I have discussed in detail with the patient that any interventional pain procedure is associated with potential risks.  The procedure may include an injection of steroids and potentially other medications (local anesthetic and normal saline) into the epidural space or surrounding tissue of the spine.  There are significant risks of this procedure which include and are not limited to infection, bleeding, worsening pain, dural puncture leading to postdural puncture headache, nerve damage, spinal cord injury, paralysis, stroke, and death.  \par \par There is a chance that the procedure does not improve their pain.  \par \par There are risks associated with the steroid being absorbed into the body systemically.  These include dysphoria, difficulty sleeping, mood swings and personality changes.  Premenopausal women may notice an irregularity in her menstrual cycle for 2-3 months following the injection.  Steroids can specifically affect patients with hypertension, diabetes, and peptic ulcers.  The procedure may cause a temporary increase in blood pressure and blood pressure, and may adversely affect a peptic ulcer.  Other, more rare complications, include avascular necrosis of joints, glaucoma and worsening of osteoporosis. \par \par I have discussed the risks of the procedure at length with the patient, and the potential benefits of pain relief.  I have offered alternatives to the procedure.  All questions were answered.  \par \par The patient expressed understanding and wishes to proceed with the procedure.\par \par 	>Regarding COVID19 Pandemic: \par \par Any planned interventional pain procedure are scheduled because further delay may cause harm or negative outcome to patient.  The goal in performing this procedure is to avoid deterioration of function, emergency room visits (which increases exposure) and reliance on opioids.  \par \par r/b/a discussed with patient, lack of evidence to conclusively determine whether pain management procedures have any positive or negative impact on the possibility of tracey the virus and/or development of any sequelae. \par \par Patient counselled regarding timing steroid based intervention 2 weeks before or after COVID-19 vaccine administration to avoid any interaction or affect on efficacy of vaccination\par \par Patient demonstrates understanding\par \par Informed patient that risks associated with the COVID-19 infection.  Informed patient steps taken to limit the risks.  We are implementing safety precautions and following protocols consistent with the CDC and state recommendations. All patients and staff will be checked for fever or signs of illness upon entry to the facility. We will limit our steroid dose to the lowest effective therapeutic dose or in some cases steroids will not be injected at all. \par \par Patient agrees to proceed\par \par >> Conclusion\par \par The above diagnosis and treatment plan is medically reasonable and necessary based on the patient encounter \par There were no barriers to communication.\par Informed patient that I would be available for any additional questions.\par Patient was instructed to call with any worsening symptoms including severe pain, new numbness/weakness, or changes in the bowel/bladder function. \par Discussed role of nsaids in pain management and all relevant risks, if patient is continuing to require after 4 weeks the patient should f/u for alternative treatment. \par Instructed patient to maintain pain diary to monitor pain level, mobility, and function.\par \par \par \par \par \par

## 2022-10-11 NOTE — REVIEW OF SYSTEMS
Glycemic Control:  Pt with no history of Diabetes. Pt's blood glucose readings are in the target range. He has  required 6 units of corrective lispro. Recent Glucose Results:   Lab Results   Component Value Date/Time     (H) 10/11/2022 06:10 AM     (H) 10/10/2022 11:37 PM    GLUCPOC 136 (H) 10/11/2022 12:23 PM    GLUCPOC 113 (H) 10/11/2022 06:21 AM    GLUCPOC 175 (H) 10/10/2022 11:08 PM     Lab Results   Component Value Date/Time    Hemoglobin A1c 5.5 10/10/2022 12:52 AM     Continue to monitor for glycemic control.   Herb García RD BC-ADM [Negative] : Constitutional

## 2022-11-04 ENCOUNTER — APPOINTMENT (OUTPATIENT)
Dept: PAIN MANAGEMENT | Facility: CLINIC | Age: 57
End: 2022-11-04

## 2022-11-04 VITALS
HEIGHT: 70 IN | WEIGHT: 160 LBS | SYSTOLIC BLOOD PRESSURE: 150 MMHG | BODY MASS INDEX: 22.9 KG/M2 | TEMPERATURE: 98 F | DIASTOLIC BLOOD PRESSURE: 89 MMHG

## 2022-11-04 PROCEDURE — 99214 OFFICE O/P EST MOD 30 MIN: CPT

## 2022-11-04 NOTE — HISTORY OF PRESENT ILLNESS
[3] : 3. What number best describes how, during the past week, pain has interfered with your general activity? 3/10 pain [10] : a maximum pain level of 10/10 [FreeTextEntry1] : Interval Note:\par \par \par sp RIGHT THORACIC DIAGNOSTIC T10, 11, 12 medial branch block  with sustained 100% improvement in right back pain.  Not taking prn pain medications. Patient reports that she occasionally has mid back pain worse with lying in her bed.  Reports that she continues to require Tylenol 3 BID. \par \par  Denies any additional weakness, numbness, bowel/bladder dysfunction.  \par \par \par HPI\par \par Ms. 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 Suh, hypertension, seizure disorder who sustained a fall in the beginning of 2/2017 and admitted to [facility] for evaluation and treatment of PNA s/p abx therapy upon discharge at [facility] in early 2/2017. As per patient she was at [facility] when she fell from her walker about 1 week ago and felt her back twist. Admitted at [facility] 2/19/17 for lowre back pain found o have acute L1 and L2 compression fracture. Patient is s/p L1 L2 kyphoplasty by me 2/20/17. Discharged to [facility] subacute rehab where patient is currently. Lower back pain has significantly improved since kyphoplasty. Patient is able to participate in PT. Denies any additional weakness, numbness or fever/chills. Incision is well healed, nonerythematous. Patient does complain of axial/mechanical bilateral lower back pain for many years without any inciting event. Nonradiating, described as aching. Patient takes tramadol 50mg BID for this pain but demonstrates desire to wean secondary to history of seizures.\par        Patient presents with  bilateral lower back pain. The pain has been occuring for many years. The pain frequency is constant with exacerbations. The character of the pain is described as aching. The current pain intensity is 7/10. With activity, the pain intensity increases to  10/10. The pain increases with activity. The pain is decreased by rest, medications. Patient reports that perfoming activities of daily living difficult. \par    History:  \par        Previous nerve block or injection \par  RIGHT THORACIC DIAGNOSTIC T10, 11, 12 medial branch block (2 joints, 3 nerves on each side)\par T8 kyphoplasty 3/3/22\par LEFT AND RIGHT  L3-sacral ala medial branch radiofrequency ablation 10/20/19\par BILATERAL L3-sacral ala diagnostic medial branch block 9/5/19\par         B/L L3-sacral ryan mbb RFA by me 5/16/17 with 100% relief of lower back pain\par         \par         Bilateral L3-sacral ryan mbb by me 3/16/17 with 90% relief of back pain for 15 hours\par         \par         Bilateral L3-sacral ryan mbb by me 4/13/17 with 50% relief of back pain for 8 hours\par         \par         L1 L2 kyphoplasty by me 2/20/17\par \par Imaging studies  \par \par MRI TS 8/22\par \par  There is no new/acute fracture. There is interval kyphoplasty of a previously identified T8\par compression deformity. Vertebral body heights are otherwise maintained.\par \par  There is normal thoracic kyphosis. No subluxation. No prevertebral soft tissue swelling.\par \par  The thoracic cord is normal in size and signal characteristics.\par \par  There is no significant disc pathology within the thoracic spine. No areas of central canal or\par neural foraminal narrowing.\par \par  No significant change in left adrenal adenoma.\par \par \par \par  IMPRESSION:\par \par  There is no new/acute fracture. There is interval kyphoplasty of a previously identified T8\par compression deformity. Vertebral body heights are otherwise maintained.\par \par  No disc herniation, central canal or neural foraminal narrowing.\par \par MRI LS 1/22\par \par Comparison is made to a prior MRI of the lumbar spine performed on 7/9/2019.\par \par  There is normal curvature to the lumbar lordosis. There is mild dextroscoliosis of the lower\par thoracic and upper lumbar spine with apex at L1/L2, unchanged. There are mild chronic compression\par fractures of the L1, L2 and L3 vertebral bodies. The patient is status post kyphoplasty of L1 and\par L2. The remaining vertebral bodies are normal height and configuration. The intervertebral disc\par demonstrate moderate loss of disc height and T2 signal at the L3/L4 and L5/S1 and mild loss at L4/L5\par disc spaces with anterior and minimal posterior osteophyte formation consistent with desiccation and\par degenerative changes. The conus terminates at the L1 level and demonstrates no evidence of abnormal\par signal changes.\par \par  Evaluation of the individual levels demonstrates at the L5/S1 level there is a mild diffuse disc\par bulge and right-sided osteophyte mildly compressing the distal right L5 foraminal exiting nerve\par root. There is a central/left paracentral protrusion flattening the ventral thecal sac and minimally\par contacting the right descending S1 nerve root and compressing and mildly posteriorly displacing the\par left descending S1 nerve root, unchanged. There is moderate to severe right and moderate left\par foraminal narrowing. There is moderate facet and ligamentous hypertrophy. There is left lateral\par recess narrowing, unchanged. There is no evidence of spinal canal stenosis.\par \par  At the L4/L5 level there is a minimal disc bulge contacting the ventral thecal sac, unchanged.\par There is mild bilateral foraminal narrowing. There is mild to moderate facet and ligamentous\par hypertrophy. There is no evidence of spinal canal stenosis.\par \par  At the L3/L4 level there is a mild diffuse disc bulge flattening the ventral thecal sac, unchanged.\par There is minimal right and mild left foraminal narrowing. There is mild facet and ligamentous\par hypertrophy. There is no evidence of spinal canal stenosis.\par \par  At the L2/L3 level there is a minimal diffuse disc bulge contacting the ventral thecal sac,\par unchanged. The bilateral neuroforamen are patent. There is mild facet degenerative changes. There is\par no evidence of spinal canal stenosis.\par \par  At the L1/L2 and T12/L1 levels there are no evidence of focal disc herniation or spinal canal\par stenosis.\par \par \par  Impression:\par \par  Mild chronic compression fractures of the L1, L2 and L3 vertebral bodies, unchanged. Status post\par post kyphoplasty of L1 and L2, unchanged.\par \par  Degenerative changes of the lumbar spine was notable at L5/S1\par \par  L5/S1  mild diffuse disc bulge and right-sided osteophyte mildly compressing the distal right L5\par foraminal exiting nerve root, unchanged. There is a central/left paracentral protrusion flattening\par the ventral thecal sac and minimally contacting the right descending S1 nerve root and compressing\par and mildly posteriorly displacing the left descending S1 nerve root, unchanged. L5/S1 no evidence of\par spinal canal stenosis.\par \par MRI TS 1/22\par \par  MRI of the thoracic spine was performed utilizing sagittal  T1, axial and sagittal T2-\par weighted and axial gradient echo images as well as sagittal STIR images.\par \par  There are no prior studies available for comparison.\par \par  Findings: There is moderate loss of height of the T8 vertebral body with a small band of STIR\par signal hyperintensity seen at the superior endplate; findings are consistent with an acute to\par subacute moderate T8 compression fracture. There is no evidence of retropulsion of fracture\par fragments.\par \par  There is kyphosis of the thoracic spine. There is a Schmorl's node seen at the superior endplate of\par T2 inferior endplate of T8. The remaining vertebral bodies are of normal height and configuration.\par The intervertebral discs spaces demonstrate mild loss of disc height and T2 signal within the T1/T2,\par T2/T3, T5/T6, T6/T7, T7/T8 and T8/T9 consistent with desiccation. There is an approximately 0.6 cm\par focus of T1, T2 and STIR signal marked hypointensity that may represent a small bone island at the\par superior endplate of T9. Evaluation of the spinal cord demonstrates no evidence of abnormal signal\par changes.\par \par  Evaluation of the individual levels demonstrates no evidence of a focal disc herniation or spinal\par cord compression.\par \par  There is an approximately 1.2 cm rounded soft tissue nodule along the left adrenal gland; for\par further evaluation, would recommend CT or MRI utilizing adrenal gland protocol.\par \par \par  Impression:\par \par  Moderate acute to subacute compression fracture of the superior endplate of T8.\par \par  No evidence of focal disc herniation or spinal cord compression.\par \par \par  Approximately 1.2 cm rounded soft tissue nodule along the left adrenal gland; for further\par evaluation, would recommend CT or MRI utilizing adrenal gland protocol.\par \par \par MRI LS 7/2019\par \par There is a mild levoscoliosis of the lower lumbar spine. A levoscoliosis of the visualized thoracic  \par spine is noted. The lumbar lordosis and alignment is grossly maintained. The marrow signal is   \par overall within normal limits with no focal marrow replacing lesion identified.  \par  \par  There has been interval kyphoplasty of previously seen L1 and L2 compression fractures. There is   \par mild loss of height of the vertebral bodies with no significant retropulsion or underlying central   \par canal stenosis. There is additionally a mild chronic compression fracture of the superior endplate   \par which has developed in the interval. There is no associated vertebral edema. No acute fracture is   \par identified. There is mild degenerative endplate edema at L5-S1. Otherwise no abnormal vertebral or   \par paraspinal edema is appreciated. The visualized paraspinal soft tissues appear grossly unremarkable.  \par  \par  The conus medullaris terminates at L2 and the thecal sac terminates at S2. No abnormal signal is   \par identified in the visualized spinal cord.  \par  \par  L1-2: Mild disc bulge and mild ligamentous hypertrophy. No significant central canal, subarticular,  \par or foraminal stenosis. No significant interval change.  \par  \par  L2-3: Mild disc bulge and mild ligamentous hypertrophy. No significant central canal, subarticular,  \par or foraminal stenosis. Mild increase in disc bulging associated with the compression of superior   \par endplate of L3. No significant increase in stenosis.  \par  \par  L3-4: Mild disc bulge. Mild facet/ligamentous hypertrophy. No significant central canal or   \par subarticular stenosis. Mild/moderate left foraminal stenosis and no significant right foraminal   \par stenosis. No significant interval change.  \par  \par  L4-5: Minimal disc bulge and mild facet/ligamentous hypertrophy. No significant central canal or   \par subarticular stenosis. Mild bilateral foraminal stenosis. Previously seen central disc protrusion is  \par no longer appreciated. Otherwise no significant interval change.  \par  \par  L5-S1: Small left paracentral disc extrusion with inferiorly oriented extension superimposed on a   \par mild disc bulge. No significant central canal stenosis. Moderate left subarticular stenosis with   \par encroachment upon the left S1 nerve root. No significant right subarticular stenosis. Moderate right  \par foraminal stenosis and no significant left foraminal stenosis. No significant interval change.  \par  \par  \par  \par  IMPRESSION:  \par  \par  No acute fracture. Chronic compression fractures of L1, L2, and L3, with kyphoplasty cement in L1   \par and L2. Chronic L3 compression fracture has developed since 2/20/2017.  \par  \par  Lumbar spondylosis. Left paracentral disc herniation at L5-S1 encroaches upon the left S1 nerve   \par root. No significant central canal stenosis. Foraminal stenosis as above, most pronounced at L5-S1   \par the right. Overall no significant change in stenosis when compared to 2/20/2017.  \par \par         MRI LS 2/20/17\par \par There is a moderate dextroscoliosis of the lower thoracic and upper lumbar spine with apex at the approximate L1-2 level. The normal lumbar lordosis and alignment is maintained. The marrow signal is overall within normal limits with no focal marrow replacing lesion identified.\par         There is a mild compression deformity of the superior endplate of L1 with no significant retropulsion or underlying central canal stenosis. There is a mild compression deformity of the superior endplate of L2 with minimal retropulsion and no significant underlying central canal stenosis. There is edema associated with both these fractures consistent with acute to subacute fractures. In the visualized sacrum, there is edema at the S2 and S3 vertebral bodies which is nonspecific, possibly reflecting an additional sacral fracture. No definite cortical break was seen in this region on recent CT of the lumbar spine.\par         The visualized paraspinal soft tissues appear grossly unremarkable. The conus medullaris terminates at L2 and the thecal sac terminates at S1-2. No abnormal signal is identified in the visualized spinal cord.\par         T12-L1: There is no significant disc bulge or herniation. There is bilateral facet hypertrophy. There is no significant central canal, subarticular, or foraminal stenosis.\par         L1-2: There is minimal retropulsion of the superior endplate of L2. There is minimal disc bulging. There is moderate bilateral facet/ligamentous hypertrophy. There is no significant central canal, subarticular, or foraminal zone stenosis.\par         L2-3: There is no significant disc bulge or herniation. There is moderate bilateral facet/ligamentous hypertrophy. There is no significant central canal, subarticular, or foraminal zone stenosis.\par         L3-4: There is a moderate diffuse disc bulge. There is moderate bilateral facet/ligamentous hypertrophy. There is no significant central canal or subarticular zone stenosis. There is mild left foraminal stenosis and no significant right foraminal stenosis.\par         L4-5: There is a small central disc protrusion. There is moderate bilateral facet/ligamentous hypertrophy. There is no significant central canal stenosis. There is mild bilateral subarticular zone stenosis. There is mild bilateral foraminal stenosis.\par         L5-S1: There is a left paracentral disc extrusion with mild inferiorly oriented subligamentous extension superimposed on mild diffuse disc bulge. There is moderate bilateral facet hypertrophy. There is no significant central canal stenosis. There is moderate/severe left subarticular zone stenosis with encroachment upon the left S1 nerve root. There is no significant right subarticular zone stenosis. There is moderate right foraminal stenosis and no significant left foraminal stenosis.\par \par IMPRESSION:\par         Acute/subacute compression fracture of the superior endplate of L1 with no significant retropulsion or central canal stenosis. Acute/subacute compression fracture of the superior endplate of L2 with minimal retropulsion and no significant underlying central canal stenosis. Edema in the partially imaged S2 on S3 vertebra suggest an additional sacral fracture.\par         At L5-S1, a left paracentral disc herniation encroaches upon the left S1 nerve root.\par         Lumbar scoliosis and spondylosis. No significant central canal stenosis. Foraminal stenosis most pronounced at L5-S1 on the right.\par         \par \par  [FreeTextEntry2] : 9

## 2022-11-04 NOTE — ASSESSMENT
[FreeTextEntry1] : >> Imaging and Other Studies\par \par I personally reviewed the relevant imaging.  Discussed and explained to patient the likely source of pathology and pain.  Questions answered. Xr\par \par I personally reviewed the relevant imaging.  Discussed and explained to patient the likely source of pathology and pain.  Questions answered. MRI\par \par >> Therapy and Other Modalities\par \par continue PT\par \par >> Medications\par  \par Regarding opiate medication to manage pain. I had a detailed discussion with the patient regarding the risks of long-term opioid use, including the potential for medication side effects, hyperaglesia, endocrine dysfunction,  \par \par Severe lower back pain with axial movement.  Pain is refractory to conservative treatment including tylenol and exercises.  Patient unable to move because of the pain and unable to perform adls.  Because of progressive deterioration secondary to severe pain from vertebral compression fracture with edema demonstrated on imaging, sp T8 vertebral kyphoplasty \par \par \par Compression fracture of lumbar vertebra, non-traumatic, sequela \par compression fracture of L1 and L2 s/p kyphoplasty with significant improvement in pain and function. \par now with chronic L3 vcf \par \par gabapentin 100mg nightly\par acetaminophen 650mg q8h prn pain (caution <3g daily)\par \par Regarding opiate medication to manage pain. I had a detailed discussion with the patient regarding the risks of long-term opioid use, including the potential for medication side effects, hyperaglesia, endocrine dysfunction,  Encouraged weaning with assistance of current prescriber.\par minimize use of Tylenol 3\par \par >> Interventions\par \par Significant component of axial back pain secondary to lumbar spondylosis and facet arthropathy demonstrated on MRI LS.  Pain refractory to conservative treatments.  s/p BILATERAL L3-sacral ala diagnostic medial branch block with significant improvement\par \par Given significant relief from diagnostic lumbar medial branch block of >80%, and continued lumbar facet arthropathy pain and axial back pain, s/p LEFT AND RIGHT  L3-sacral ala medial branch radiofrequency ablation with improvement in axial back pain\par \par Significant component of axial mid back pain secondary to thoracic spondylosis and facet arthropathy demonstrated on MRI LS.  Pain refractory to conservative treatments. sp  RIGHT THORACIC DIAGNOSTIC medial branch block T10, 11, 12 (2 joints, 3 nerves on each side) with significant improvement\par \par may consider repeat intervention\par \par May consider radiofreqency ablation\par \par >> Consults\par \par referral endocrinologist for osteoporosis\par \par >> Discussion of Risks/Benefits/Alternatives\par \par 	>Regarding any scheduled procedures:\par \par I have discussed in detail with the patient that any interventional pain procedure is associated with potential risks.  The procedure may include an injection of steroids and potentially other medications (local anesthetic and normal saline) into the epidural space or surrounding tissue of the spine.  There are significant risks of this procedure which include and are not limited to infection, bleeding, worsening pain, dural puncture leading to postdural puncture headache, nerve damage, spinal cord injury, paralysis, stroke, and death.  \par \par There is a chance that the procedure does not improve their pain.  \par \par There are risks associated with the steroid being absorbed into the body systemically.  These include dysphoria, difficulty sleeping, mood swings and personality changes.  Premenopausal women may notice an irregularity in her menstrual cycle for 2-3 months following the injection.  Steroids can specifically affect patients with hypertension, diabetes, and peptic ulcers.  The procedure may cause a temporary increase in blood pressure and blood pressure, and may adversely affect a peptic ulcer.  Other, more rare complications, include avascular necrosis of joints, glaucoma and worsening of osteoporosis. \par \par I have discussed the risks of the procedure at length with the patient, and the potential benefits of pain relief.  I have offered alternatives to the procedure.  All questions were answered.  \par \par The patient expressed understanding and wishes to proceed with the procedure.\par \par 	>Regarding COVID19 Pandemic: \par \par Any planned interventional pain procedure are scheduled because further delay may cause harm or negative outcome to patient.  The goal in performing this procedure is to avoid deterioration of function, emergency room visits (which increases exposure) and reliance on opioids.  \par \par r/b/a discussed with patient, lack of evidence to conclusively determine whether pain management procedures have any positive or negative impact on the possibility of tracey the virus and/or development of any sequelae. \par \par Patient counselled regarding timing steroid based intervention 2 weeks before or after COVID-19 vaccine administration to avoid any interaction or affect on efficacy of vaccination\par \par Patient demonstrates understanding\par \par Informed patient that risks associated with the COVID-19 infection.  Informed patient steps taken to limit the risks.  We are implementing safety precautions and following protocols consistent with the CDC and state recommendations. All patients and staff will be checked for fever or signs of illness upon entry to the facility. We will limit our steroid dose to the lowest effective therapeutic dose or in some cases steroids will not be injected at all. \par \par Patient agrees to proceed\par \par >> Conclusion\par \par The above diagnosis and treatment plan is medically reasonable and necessary based on the patient encounter \par There were no barriers to communication.\par Informed patient that I would be available for any additional questions.\par Patient was instructed to call with any worsening symptoms including severe pain, new numbness/weakness, or changes in the bowel/bladder function. \par Discussed role of nsaids in pain management and all relevant risks, if patient is continuing to require after 4 weeks the patient should f/u for alternative treatment. \par Instructed patient to maintain pain diary to monitor pain level, mobility, and function.\par \par \par \par \par \par

## 2022-12-01 ENCOUNTER — APPOINTMENT (OUTPATIENT)
Dept: PAIN MANAGEMENT | Facility: HOSPITAL | Age: 57
End: 2022-12-01

## 2022-12-09 ENCOUNTER — RESULT REVIEW (OUTPATIENT)
Age: 57
End: 2022-12-09

## 2022-12-09 ENCOUNTER — APPOINTMENT (OUTPATIENT)
Dept: PAIN MANAGEMENT | Facility: CLINIC | Age: 57
End: 2022-12-09

## 2022-12-09 VITALS
WEIGHT: 160 LBS | SYSTOLIC BLOOD PRESSURE: 142 MMHG | HEIGHT: 70 IN | DIASTOLIC BLOOD PRESSURE: 84 MMHG | TEMPERATURE: 98 F | BODY MASS INDEX: 22.9 KG/M2

## 2022-12-09 PROCEDURE — 99214 OFFICE O/P EST MOD 30 MIN: CPT

## 2022-12-09 NOTE — PHYSICAL EXAM
[Facet Tenderness] : facet tenderness [Spine: Flexion to ___ degrees, without pain] : spine: flexion to [unfilled] degrees, without pain [Normal] : Normal affect

## 2022-12-09 NOTE — HISTORY OF PRESENT ILLNESS
[5] : 3. What number best describes how, during the past week, pain has interfered with your general activity? 5/10 pain [FreeTextEntry1] : Interval Note:\par \par Patient reports that his pain over the right thoracic region returned.  Patient reports that her nursing home mis-scheduled the procedure. She returns for the procedure.  Continues to have significant pain over the area. denies any worsening numbness, weakness, bowel/bladder dysfunction. \par \par \par HPI\par \par Ms. 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 Suh, hypertension, seizure disorder who sustained a fall in the beginning of 2/2017 and admitted to [facility] for evaluation and treatment of PNA s/p abx therapy upon discharge at [facility] in early 2/2017. As per patient she was at [place] when she fell from her walker about 1 week ago and felt her back twist. Admitted at [facility] 2/19/17 for lowre back pain found o have acute L1 and L2 compression fracture. Patient is s/p L1 L2 kyphoplasty by me 2/20/17. Discharged to [facility] subacute rehab where patient is currently. Lower back pain has significantly improved since kyphoplasty. Patient is able to participate in PT. Denies any additional weakness, numbness or fever/chills. Incision is well healed, nonerythematous. Patient does complain of axial/mechanical bilateral lower back pain for many years without any inciting event. Nonradiating, described as aching. Patient takes tramadol 50mg BID for this pain but demonstrates desire to wean secondary to history of seizures.\par        Patient presents with  bilateral lower back pain. The pain has been occuring for many years. The pain frequency is constant with exacerbations. The character of the pain is described as aching. The current pain intensity is 7/10. With activity, the pain intensity increases to  10/10. The pain increases with activity. The pain is decreased by rest, medications. Patient reports that perfoming activities of daily living difficult. \par    History:  \par        Previous nerve block or injection \par  RIGHT THORACIC DIAGNOSTIC T10, 11, 12 medial branch block (2 joints, 3 nerves on each side)\par T8 kyphoplasty 3/3/22\par LEFT AND RIGHT  L3-sacral ala medial branch radiofrequency ablation 10/20/19\par BILATERAL L3-sacral ala diagnostic medial branch block 9/5/19\par         B/L L3-sacral ryan mbb RFA by me 5/16/17 with 100% relief of lower back pain\par         \par         Bilateral L3-sacral ryan mbb by me 3/16/17 with 90% relief of back pain for 15 hours\par         \par         Bilateral L3-sacral ryan mbb by me 4/13/17 with 50% relief of back pain for 8 hours\par         \par         L1 L2 kyphoplasty by me 2/20/17\par \par Imaging studies  \par \par MRI TS 8/22\par \par  There is no new/acute fracture. There is interval kyphoplasty of a previously identified T8\par compression deformity. Vertebral body heights are otherwise maintained.\par \par  There is normal thoracic kyphosis. No subluxation. No prevertebral soft tissue swelling.\par \par  The thoracic cord is normal in size and signal characteristics.\par \par  There is no significant disc pathology within the thoracic spine. No areas of central canal or\par neural foraminal narrowing.\par \par  No significant change in left adrenal adenoma.\par \par \par \par  IMPRESSION:\par \par  There is no new/acute fracture. There is interval kyphoplasty of a previously identified T8\par compression deformity. Vertebral body heights are otherwise maintained.\par \par  No disc herniation, central canal or neural foraminal narrowing.\par \par MRI LS 1/22\par \par Comparison is made to a prior MRI of the lumbar spine performed on 7/9/2019.\par \par  There is normal curvature to the lumbar lordosis. There is mild dextroscoliosis of the lower\par thoracic and upper lumbar spine with apex at L1/L2, unchanged. There are mild chronic compression\par fractures of the L1, L2 and L3 vertebral bodies. The patient is status post kyphoplasty of L1 and\par L2. The remaining vertebral bodies are normal height and configuration. The intervertebral disc\par demonstrate moderate loss of disc height and T2 signal at the L3/L4 and L5/S1 and mild loss at L4/L5\par disc spaces with anterior and minimal posterior osteophyte formation consistent with desiccation and\par degenerative changes. The conus terminates at the L1 level and demonstrates no evidence of abnormal\par signal changes.\par \par  Evaluation of the individual levels demonstrates at the L5/S1 level there is a mild diffuse disc\par bulge and right-sided osteophyte mildly compressing the distal right L5 foraminal exiting nerve\par root. There is a central/left paracentral protrusion flattening the ventral thecal sac and minimally\par contacting the right descending S1 nerve root and compressing and mildly posteriorly displacing the\par left descending S1 nerve root, unchanged. There is moderate to severe right and moderate left\par foraminal narrowing. There is moderate facet and ligamentous hypertrophy. There is left lateral\par recess narrowing, unchanged. There is no evidence of spinal canal stenosis.\par \par  At the L4/L5 level there is a minimal disc bulge contacting the ventral thecal sac, unchanged.\par There is mild bilateral foraminal narrowing. There is mild to moderate facet and ligamentous\par hypertrophy. There is no evidence of spinal canal stenosis.\par \par  At the L3/L4 level there is a mild diffuse disc bulge flattening the ventral thecal sac, unchanged.\par There is minimal right and mild left foraminal narrowing. There is mild facet and ligamentous\par hypertrophy. There is no evidence of spinal canal stenosis.\par \par  At the L2/L3 level there is a minimal diffuse disc bulge contacting the ventral thecal sac,\par unchanged. The bilateral neuroforamen are patent. There is mild facet degenerative changes. There is\par no evidence of spinal canal stenosis.\par \par  At the L1/L2 and T12/L1 levels there are no evidence of focal disc herniation or spinal canal\par stenosis.\par \par \par  Impression:\par \par  Mild chronic compression fractures of the L1, L2 and L3 vertebral bodies, unchanged. Status post\par post kyphoplasty of L1 and L2, unchanged.\par \par  Degenerative changes of the lumbar spine was notable at L5/S1\par \par  L5/S1  mild diffuse disc bulge and right-sided osteophyte mildly compressing the distal right L5\par foraminal exiting nerve root, unchanged. There is a central/left paracentral protrusion flattening\par the ventral thecal sac and minimally contacting the right descending S1 nerve root and compressing\par and mildly posteriorly displacing the left descending S1 nerve root, unchanged. L5/S1 no evidence of\par spinal canal stenosis.\par \par MRI TS 1/22\par \par  MRI of the thoracic spine was performed utilizing sagittal  T1, axial and sagittal T2-\par weighted and axial gradient echo images as well as sagittal STIR images.\par \par  There are no prior studies available for comparison.\par \par  Findings: There is moderate loss of height of the T8 vertebral body with a small band of STIR\par signal hyperintensity seen at the superior endplate; findings are consistent with an acute to\par subacute moderate T8 compression fracture. There is no evidence of retropulsion of fracture\par fragments.\par \par  There is kyphosis of the thoracic spine. There is a Schmorl's node seen at the superior endplate of\par T2 inferior endplate of T8. The remaining vertebral bodies are of normal height and configuration.\par The intervertebral discs spaces demonstrate mild loss of disc height and T2 signal within the T1/T2,\par T2/T3, T5/T6, T6/T7, T7/T8 and T8/T9 consistent with desiccation. There is an approximately 0.6 cm\par focus of T1, T2 and STIR signal marked hypointensity that may represent a small bone island at the\par superior endplate of T9. Evaluation of the spinal cord demonstrates no evidence of abnormal signal\par changes.\par \par  Evaluation of the individual levels demonstrates no evidence of a focal disc herniation or spinal\par cord compression.\par \par  There is an approximately 1.2 cm rounded soft tissue nodule along the left adrenal gland; for\par further evaluation, would recommend CT or MRI utilizing adrenal gland protocol.\par \par \par  Impression:\par \par  Moderate acute to subacute compression fracture of the superior endplate of T8.\par \par  No evidence of focal disc herniation or spinal cord compression.\par \par \par  Approximately 1.2 cm rounded soft tissue nodule along the left adrenal gland; for further\par evaluation, would recommend CT or MRI utilizing adrenal gland protocol.\par \par \par MRI LS 7/2019\par \par There is a mild levoscoliosis of the lower lumbar spine. A levoscoliosis of the visualized thoracic  \par spine is noted. The lumbar lordosis and alignment is grossly maintained. The marrow signal is   \par overall within normal limits with no focal marrow replacing lesion identified.  \par  \par  There has been interval kyphoplasty of previously seen L1 and L2 compression fractures. There is   \par mild loss of height of the vertebral bodies with no significant retropulsion or underlying central   \par canal stenosis. There is additionally a mild chronic compression fracture of the superior endplate   \par which has developed in the interval. There is no associated vertebral edema. No acute fracture is   \par identified. There is mild degenerative endplate edema at L5-S1. Otherwise no abnormal vertebral or   \par paraspinal edema is appreciated. The visualized paraspinal soft tissues appear grossly unremarkable.  \par  \par  The conus medullaris terminates at L2 and the thecal sac terminates at S2. No abnormal signal is   \par identified in the visualized spinal cord.  \par  \par  L1-2: Mild disc bulge and mild ligamentous hypertrophy. No significant central canal, subarticular,  \par or foraminal stenosis. No significant interval change.  \par  \par  L2-3: Mild disc bulge and mild ligamentous hypertrophy. No significant central canal, subarticular,  \par or foraminal stenosis. Mild increase in disc bulging associated with the compression of superior   \par endplate of L3. No significant increase in stenosis.  \par  \par  L3-4: Mild disc bulge. Mild facet/ligamentous hypertrophy. No significant central canal or   \par subarticular stenosis. Mild/moderate left foraminal stenosis and no significant right foraminal   \par stenosis. No significant interval change.  \par  \par  L4-5: Minimal disc bulge and mild facet/ligamentous hypertrophy. No significant central canal or   \par subarticular stenosis. Mild bilateral foraminal stenosis. Previously seen central disc protrusion is  \par no longer appreciated. Otherwise no significant interval change.  \par  \par  L5-S1: Small left paracentral disc extrusion with inferiorly oriented extension superimposed on a   \par mild disc bulge. No significant central canal stenosis. Moderate left subarticular stenosis with   \par encroachment upon the left S1 nerve root. No significant right subarticular stenosis. Moderate right  \par foraminal stenosis and no significant left foraminal stenosis. No significant interval change.  \par  \par  \par  \par  IMPRESSION:  \par  \par  No acute fracture. Chronic compression fractures of L1, L2, and L3, with kyphoplasty cement in L1   \par and L2. Chronic L3 compression fracture has developed since 2/20/2017.  \par  \par  Lumbar spondylosis. Left paracentral disc herniation at L5-S1 encroaches upon the left S1 nerve   \par root. No significant central canal stenosis. Foraminal stenosis as above, most pronounced at L5-S1   \par the right. Overall no significant change in stenosis when compared to 2/20/2017.  \par \par         MRI LS 2/20/17\par \par There is a moderate dextroscoliosis of the lower thoracic and upper lumbar spine with apex at the approximate L1-2 level. The normal lumbar lordosis and alignment is maintained. The marrow signal is overall within normal limits with no focal marrow replacing lesion identified.\par         There is a mild compression deformity of the superior endplate of L1 with no significant retropulsion or underlying central canal stenosis. There is a mild compression deformity of the superior endplate of L2 with minimal retropulsion and no significant underlying central canal stenosis. There is edema associated with both these fractures consistent with acute to subacute fractures. In the visualized sacrum, there is edema at the S2 and S3 vertebral bodies which is nonspecific, possibly reflecting an additional sacral fracture. No definite cortical break was seen in this region on recent CT of the lumbar spine.\par         The visualized paraspinal soft tissues appear grossly unremarkable. The conus medullaris terminates at L2 and the thecal sac terminates at S1-2. No abnormal signal is identified in the visualized spinal cord.\par         T12-L1: There is no significant disc bulge or herniation. There is bilateral facet hypertrophy. There is no significant central canal, subarticular, or foraminal stenosis.\par         L1-2: There is minimal retropulsion of the superior endplate of L2. There is minimal disc bulging. There is moderate bilateral facet/ligamentous hypertrophy. There is no significant central canal, subarticular, or foraminal zone stenosis.\par         L2-3: There is no significant disc bulge or herniation. There is moderate bilateral facet/ligamentous hypertrophy. There is no significant central canal, subarticular, or foraminal zone stenosis.\par         L3-4: There is a moderate diffuse disc bulge. There is moderate bilateral facet/ligamentous hypertrophy. There is no significant central canal or subarticular zone stenosis. There is mild left foraminal stenosis and no significant right foraminal stenosis.\par         L4-5: There is a small central disc protrusion. There is moderate bilateral facet/ligamentous hypertrophy. There is no significant central canal stenosis. There is mild bilateral subarticular zone stenosis. There is mild bilateral foraminal stenosis.\par         L5-S1: There is a left paracentral disc extrusion with mild inferiorly oriented subligamentous extension superimposed on mild diffuse disc bulge. There is moderate bilateral facet hypertrophy. There is no significant central canal stenosis. There is moderate/severe left subarticular zone stenosis with encroachment upon the left S1 nerve root. There is no significant right subarticular zone stenosis. There is moderate right foraminal stenosis and no significant left foraminal stenosis.\par \par IMPRESSION:\par         Acute/subacute compression fracture of the superior endplate of L1 with no significant retropulsion or central canal stenosis. Acute/subacute compression fracture of the superior endplate of L2 with minimal retropulsion and no significant underlying central canal stenosis. Edema in the partially imaged S2 on S3 vertebra suggest an additional sacral fracture.\par         At L5-S1, a left paracentral disc herniation encroaches upon the left S1 nerve root.\par         Lumbar scoliosis and spondylosis. No significant central canal stenosis. Foraminal stenosis most pronounced at L5-S1 on the right.\par         \par \par  [FreeTextEntry2] : 15

## 2022-12-09 NOTE — ASSESSMENT
[FreeTextEntry1] : >> Imaging and Other Studies\par \par I personally reviewed the relevant imaging.  Discussed and explained to patient the likely source of pathology and pain.  Questions answered. Xr\par \par I personally reviewed the relevant imaging.  Discussed and explained to patient the likely source of pathology and pain.  Questions answered. MRI\par \par >> Therapy and Other Modalities\par \par continue PT\par \par >> Medications\par  \par Regarding opiate medication to manage pain. I had a detailed discussion with the patient regarding the risks of long-term opioid use, including the potential for medication side effects, hyperaglesia, endocrine dysfunction,  \par \par Severe lower back pain with axial movement.  Pain is refractory to conservative treatment including tylenol and exercises.  Patient unable to move because of the pain and unable to perform adls.  Because of progressive deterioration secondary to severe pain from vertebral compression fracture with edema demonstrated on imaging, sp T8 vertebral kyphoplasty \par \par \par Compression fracture of lumbar vertebra, non-traumatic, sequela \par compression fracture of L1 and L2 s/p kyphoplasty with significant improvement in pain and function. \par now with chronic L3 vcf \par \par gabapentin 100mg nightly\par acetaminophen 650mg q8h prn pain (caution <3g daily)\par \par Regarding opiate medication to manage pain. I had a detailed discussion with the patient regarding the risks of long-term opioid use, including the potential for medication side effects, hyperaglesia, endocrine dysfunction,  Encouraged weaning with assistance of current prescriber.\par minimize use of Tylenol 3\par \par >> Interventions\par \par Significant component of axial back pain secondary to lumbar spondylosis and facet arthropathy demonstrated on MRI LS.  Pain refractory to conservative treatments.  s/p BILATERAL L3-sacral ala diagnostic medial branch block with significant improvement\par \par Given significant relief from diagnostic lumbar medial branch block of >80%, and continued lumbar facet arthropathy pain and axial back pain, s/p LEFT AND RIGHT  L3-sacral ala medial branch radiofrequency ablation with improvement in axial back pain\par \par Significant component of axial mid back pain secondary to thoracic spondylosis and facet arthropathy demonstrated on MRI LS.  Pain refractory to conservative treatments. sp  RIGHT THORACIC DIAGNOSTIC medial branch block T10, 11, 12 (2 joints, 3 nerves on each side) with significant improvement for 2 weeks\par \par given efficacy of previous intervention that is >80% improvement in pain and improved ability to perform adls, and return of pain despite conservative treatment, will schedule repeat RIGHT THORACIC DIAGNOSTIC medial branch block T10, 11, 12 (2 joints, 3 nerves on each side) r/b/a discussed\par \par \par patient will consult with prescriber regarding holding coumadin 5 days prior to procedure, check INR day of procedure\par \par May consider radiofreqency ablation\par \par >> Consults\par \par referral endocrinologist for osteoporosis\par \par >> Discussion of Risks/Benefits/Alternatives\par \par 	>Regarding any scheduled procedures:\par \par I have discussed in detail with the patient that any interventional pain procedure is associated with potential risks.  The procedure may include an injection of steroids and potentially other medications (local anesthetic and normal saline) into the epidural space or surrounding tissue of the spine.  There are significant risks of this procedure which include and are not limited to infection, bleeding, worsening pain, dural puncture leading to postdural puncture headache, nerve damage, spinal cord injury, paralysis, stroke, and death.  \par \par There is a chance that the procedure does not improve their pain.  \par \par There are risks associated with the steroid being absorbed into the body systemically.  These include dysphoria, difficulty sleeping, mood swings and personality changes.  Premenopausal women may notice an irregularity in her menstrual cycle for 2-3 months following the injection.  Steroids can specifically affect patients with hypertension, diabetes, and peptic ulcers.  The procedure may cause a temporary increase in blood pressure and blood pressure, and may adversely affect a peptic ulcer.  Other, more rare complications, include avascular necrosis of joints, glaucoma and worsening of osteoporosis. \par \par I have discussed the risks of the procedure at length with the patient, and the potential benefits of pain relief.  I have offered alternatives to the procedure.  All questions were answered.  \par \par The patient expressed understanding and wishes to proceed with the procedure.\par \par 	>Regarding COVID19 Pandemic: \par \par Any planned interventional pain procedure are scheduled because further delay may cause harm or negative outcome to patient.  The goal in performing this procedure is to avoid deterioration of function, emergency room visits (which increases exposure) and reliance on opioids.  \par \par r/b/a discussed with patient, lack of evidence to conclusively determine whether pain management procedures have any positive or negative impact on the possibility of tracey the virus and/or development of any sequelae. \par \par Patient counselled regarding timing steroid based intervention 2 weeks before or after COVID-19 vaccine administration to avoid any interaction or affect on efficacy of vaccination\par \par Patient demonstrates understanding\par \par Informed patient that risks associated with the COVID-19 infection.  Informed patient steps taken to limit the risks.  We are implementing safety precautions and following protocols consistent with the CDC and state recommendations. All patients and staff will be checked for fever or signs of illness upon entry to the facility. We will limit our steroid dose to the lowest effective therapeutic dose or in some cases steroids will not be injected at all. \par \par Patient agrees to proceed\par \par >> Conclusion\par \par The above diagnosis and treatment plan is medically reasonable and necessary based on the patient encounter \par There were no barriers to communication.\par Informed patient that I would be available for any additional questions.\par Patient was instructed to call with any worsening symptoms including severe pain, new numbness/weakness, or changes in the bowel/bladder function. \par Discussed role of nsaids in pain management and all relevant risks, if patient is continuing to require after 4 weeks the patient should f/u for alternative treatment. \par Instructed patient to maintain pain diary to monitor pain level, mobility, and function.\par \par \par \par \par \par

## 2022-12-14 ENCOUNTER — APPOINTMENT (OUTPATIENT)
Dept: PAIN MANAGEMENT | Facility: HOSPITAL | Age: 57
End: 2022-12-14

## 2022-12-14 ENCOUNTER — TRANSCRIPTION ENCOUNTER (OUTPATIENT)
Age: 57
End: 2022-12-14

## 2022-12-16 ENCOUNTER — APPOINTMENT (OUTPATIENT)
Dept: PAIN MANAGEMENT | Facility: CLINIC | Age: 57
End: 2022-12-16

## 2022-12-16 VITALS
BODY MASS INDEX: 22.9 KG/M2 | WEIGHT: 160 LBS | TEMPERATURE: 98 F | SYSTOLIC BLOOD PRESSURE: 124 MMHG | DIASTOLIC BLOOD PRESSURE: 78 MMHG | HEIGHT: 70 IN

## 2022-12-16 PROCEDURE — 99214 OFFICE O/P EST MOD 30 MIN: CPT

## 2022-12-16 NOTE — HISTORY OF PRESENT ILLNESS
[5] : 3. What number best describes how, during the past week, pain has interfered with your general activity? 5/10 pain [FreeTextEntry1] : Interval Note:\par \par sp RIGHT THORACIC DIAGNOSTIC medial branch block T10, 11, 12 (2 joints, 3 nerves on each side) \par with 100% improvement in pain.  Patient reports that she is very comfortable.  She continues to take Tylenol 3 at the nursing home however. denies any worsening numbness, weakness, bowel/bladder dysfunction. \par \par \par HPI\par \par Ms. 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 Suh, hypertension, seizure disorder who sustained a fall in the beginning of 2/2017 and admitted to [facility] for evaluation and treatment of PNA s/p abx therapy upon discharge at [facility] in early 2/2017. As per patient she was at [facility] when she fell from her walker about 1 week ago and felt her back twist. Admitted at [facility] 2/19/17 for lowre back pain found o have acute L1 and L2 compression fracture. Patient is s/p L1 L2 kyphoplasty by me 2/20/17. Discharged to [facility] subacute rehab where patient is currently. Lower back pain has significantly improved since kyphoplasty. Patient is able to participate in PT. Denies any additional weakness, numbness or fever/chills. Incision is well healed, nonerythematous. Patient does complain of axial/mechanical bilateral lower back pain for many years without any inciting event. Nonradiating, described as aching. Patient takes tramadol 50mg BID for this pain but demonstrates desire to wean secondary to history of seizures.\par        Patient presents with  bilateral lower back pain. The pain has been occuring for many years. The pain frequency is constant with exacerbations. The character of the pain is described as aching. The current pain intensity is 7/10. With activity, the pain intensity increases to  10/10. The pain increases with activity. The pain is decreased by rest, medications. Patient reports that perfoming activities of daily living difficult. \par    History:  \par        Previous nerve block or injection \par \par repeat RIGHT THORACIC DIAGNOSTIC medial branch block T10, 11, 12 (2 joints, 3 nerves on each side) 12/14/22\par  RIGHT THORACIC DIAGNOSTIC T10, 11, 12 medial branch block (2 joints, 3 nerves on each side)\par T8 kyphoplasty 3/3/22\par LEFT AND RIGHT  L3-sacral ala medial branch radiofrequency ablation 10/20/19\par BILATERAL L3-sacral ala diagnostic medial branch block 9/5/19\par         B/L L3-sacral ryan mbb RFA by me 5/16/17 with 100% relief of lower back pain\par         \par         Bilateral L3-sacral ryan mbb by me 3/16/17 with 90% relief of back pain for 15 hours\par         \par         Bilateral L3-sacral ryan mbb by me 4/13/17 with 50% relief of back pain for 8 hours\par         \par         L1 L2 kyphoplasty by me 2/20/17\par \par Imaging studies  \par \par MRI TS 8/22\par \par  There is no new/acute fracture. There is interval kyphoplasty of a previously identified T8\par compression deformity. Vertebral body heights are otherwise maintained.\par \par  There is normal thoracic kyphosis. No subluxation. No prevertebral soft tissue swelling.\par \par  The thoracic cord is normal in size and signal characteristics.\par \par  There is no significant disc pathology within the thoracic spine. No areas of central canal or\par neural foraminal narrowing.\par \par  No significant change in left adrenal adenoma.\par \par \par \par  IMPRESSION:\par \par  There is no new/acute fracture. There is interval kyphoplasty of a previously identified T8\par compression deformity. Vertebral body heights are otherwise maintained.\par \par  No disc herniation, central canal or neural foraminal narrowing.\par \par MRI LS 1/22\par \par Comparison is made to a prior MRI of the lumbar spine performed on 7/9/2019.\par \par  There is normal curvature to the lumbar lordosis. There is mild dextroscoliosis of the lower\par thoracic and upper lumbar spine with apex at L1/L2, unchanged. There are mild chronic compression\par fractures of the L1, L2 and L3 vertebral bodies. The patient is status post kyphoplasty of L1 and\par L2. The remaining vertebral bodies are normal height and configuration. The intervertebral disc\par demonstrate moderate loss of disc height and T2 signal at the L3/L4 and L5/S1 and mild loss at L4/L5\par disc spaces with anterior and minimal posterior osteophyte formation consistent with desiccation and\par degenerative changes. The conus terminates at the L1 level and demonstrates no evidence of abnormal\par signal changes.\par \par  Evaluation of the individual levels demonstrates at the L5/S1 level there is a mild diffuse disc\par bulge and right-sided osteophyte mildly compressing the distal right L5 foraminal exiting nerve\par root. There is a central/left paracentral protrusion flattening the ventral thecal sac and minimally\par contacting the right descending S1 nerve root and compressing and mildly posteriorly displacing the\par left descending S1 nerve root, unchanged. There is moderate to severe right and moderate left\par foraminal narrowing. There is moderate facet and ligamentous hypertrophy. There is left lateral\par recess narrowing, unchanged. There is no evidence of spinal canal stenosis.\par \par  At the L4/L5 level there is a minimal disc bulge contacting the ventral thecal sac, unchanged.\par There is mild bilateral foraminal narrowing. There is mild to moderate facet and ligamentous\par hypertrophy. There is no evidence of spinal canal stenosis.\par \par  At the L3/L4 level there is a mild diffuse disc bulge flattening the ventral thecal sac, unchanged.\par There is minimal right and mild left foraminal narrowing. There is mild facet and ligamentous\par hypertrophy. There is no evidence of spinal canal stenosis.\par \par  At the L2/L3 level there is a minimal diffuse disc bulge contacting the ventral thecal sac,\par unchanged. The bilateral neuroforamen are patent. There is mild facet degenerative changes. There is\par no evidence of spinal canal stenosis.\par \par  At the L1/L2 and T12/L1 levels there are no evidence of focal disc herniation or spinal canal\par stenosis.\par \par \par  Impression:\par \par  Mild chronic compression fractures of the L1, L2 and L3 vertebral bodies, unchanged. Status post\par post kyphoplasty of L1 and L2, unchanged.\par \par  Degenerative changes of the lumbar spine was notable at L5/S1\par \par  L5/S1  mild diffuse disc bulge and right-sided osteophyte mildly compressing the distal right L5\par foraminal exiting nerve root, unchanged. There is a central/left paracentral protrusion flattening\par the ventral thecal sac and minimally contacting the right descending S1 nerve root and compressing\par and mildly posteriorly displacing the left descending S1 nerve root, unchanged. L5/S1 no evidence of\par spinal canal stenosis.\par \par MRI TS 1/22\par \par  MRI of the thoracic spine was performed utilizing sagittal  T1, axial and sagittal T2-\par weighted and axial gradient echo images as well as sagittal STIR images.\par \par  There are no prior studies available for comparison.\par \par  Findings: There is moderate loss of height of the T8 vertebral body with a small band of STIR\par signal hyperintensity seen at the superior endplate; findings are consistent with an acute to\par subacute moderate T8 compression fracture. There is no evidence of retropulsion of fracture\par fragments.\par \par  There is kyphosis of the thoracic spine. There is a Schmorl's node seen at the superior endplate of\par T2 inferior endplate of T8. The remaining vertebral bodies are of normal height and configuration.\par The intervertebral discs spaces demonstrate mild loss of disc height and T2 signal within the T1/T2,\par T2/T3, T5/T6, T6/T7, T7/T8 and T8/T9 consistent with desiccation. There is an approximately 0.6 cm\par focus of T1, T2 and STIR signal marked hypointensity that may represent a small bone island at the\par superior endplate of T9. Evaluation of the spinal cord demonstrates no evidence of abnormal signal\par changes.\par \par  Evaluation of the individual levels demonstrates no evidence of a focal disc herniation or spinal\par cord compression.\par \par  There is an approximately 1.2 cm rounded soft tissue nodule along the left adrenal gland; for\par further evaluation, would recommend CT or MRI utilizing adrenal gland protocol.\par \par \par  Impression:\par \par  Moderate acute to subacute compression fracture of the superior endplate of T8.\par \par  No evidence of focal disc herniation or spinal cord compression.\par \par \par  Approximately 1.2 cm rounded soft tissue nodule along the left adrenal gland; for further\par evaluation, would recommend CT or MRI utilizing adrenal gland protocol.\par \par \par MRI LS 7/2019\par \par There is a mild levoscoliosis of the lower lumbar spine. A levoscoliosis of the visualized thoracic  \par spine is noted. The lumbar lordosis and alignment is grossly maintained. The marrow signal is   \par overall within normal limits with no focal marrow replacing lesion identified.  \par  \par  There has been interval kyphoplasty of previously seen L1 and L2 compression fractures. There is   \par mild loss of height of the vertebral bodies with no significant retropulsion or underlying central   \par canal stenosis. There is additionally a mild chronic compression fracture of the superior endplate   \par which has developed in the interval. There is no associated vertebral edema. No acute fracture is   \par identified. There is mild degenerative endplate edema at L5-S1. Otherwise no abnormal vertebral or   \par paraspinal edema is appreciated. The visualized paraspinal soft tissues appear grossly unremarkable.  \par  \par  The conus medullaris terminates at L2 and the thecal sac terminates at S2. No abnormal signal is   \par identified in the visualized spinal cord.  \par  \par  L1-2: Mild disc bulge and mild ligamentous hypertrophy. No significant central canal, subarticular,  \par or foraminal stenosis. No significant interval change.  \par  \par  L2-3: Mild disc bulge and mild ligamentous hypertrophy. No significant central canal, subarticular,  \par or foraminal stenosis. Mild increase in disc bulging associated with the compression of superior   \par endplate of L3. No significant increase in stenosis.  \par  \par  L3-4: Mild disc bulge. Mild facet/ligamentous hypertrophy. No significant central canal or   \par subarticular stenosis. Mild/moderate left foraminal stenosis and no significant right foraminal   \par stenosis. No significant interval change.  \par  \par  L4-5: Minimal disc bulge and mild facet/ligamentous hypertrophy. No significant central canal or   \par subarticular stenosis. Mild bilateral foraminal stenosis. Previously seen central disc protrusion is  \par no longer appreciated. Otherwise no significant interval change.  \par  \par  L5-S1: Small left paracentral disc extrusion with inferiorly oriented extension superimposed on a   \par mild disc bulge. No significant central canal stenosis. Moderate left subarticular stenosis with   \par encroachment upon the left S1 nerve root. No significant right subarticular stenosis. Moderate right  \par foraminal stenosis and no significant left foraminal stenosis. No significant interval change.  \par  \par  \par  \par  IMPRESSION:  \par  \par  No acute fracture. Chronic compression fractures of L1, L2, and L3, with kyphoplasty cement in L1   \par and L2. Chronic L3 compression fracture has developed since 2/20/2017.  \par  \par  Lumbar spondylosis. Left paracentral disc herniation at L5-S1 encroaches upon the left S1 nerve   \par root. No significant central canal stenosis. Foraminal stenosis as above, most pronounced at L5-S1   \par the right. Overall no significant change in stenosis when compared to 2/20/2017.  \par \par         MRI LS 2/20/17\par \par There is a moderate dextroscoliosis of the lower thoracic and upper lumbar spine with apex at the approximate L1-2 level. The normal lumbar lordosis and alignment is maintained. The marrow signal is overall within normal limits with no focal marrow replacing lesion identified.\par         There is a mild compression deformity of the superior endplate of L1 with no significant retropulsion or underlying central canal stenosis. There is a mild compression deformity of the superior endplate of L2 with minimal retropulsion and no significant underlying central canal stenosis. There is edema associated with both these fractures consistent with acute to subacute fractures. In the visualized sacrum, there is edema at the S2 and S3 vertebral bodies which is nonspecific, possibly reflecting an additional sacral fracture. No definite cortical break was seen in this region on recent CT of the lumbar spine.\par         The visualized paraspinal soft tissues appear grossly unremarkable. The conus medullaris terminates at L2 and the thecal sac terminates at S1-2. No abnormal signal is identified in the visualized spinal cord.\par         T12-L1: There is no significant disc bulge or herniation. There is bilateral facet hypertrophy. There is no significant central canal, subarticular, or foraminal stenosis.\par         L1-2: There is minimal retropulsion of the superior endplate of L2. There is minimal disc bulging. There is moderate bilateral facet/ligamentous hypertrophy. There is no significant central canal, subarticular, or foraminal zone stenosis.\par         L2-3: There is no significant disc bulge or herniation. There is moderate bilateral facet/ligamentous hypertrophy. There is no significant central canal, subarticular, or foraminal zone stenosis.\par         L3-4: There is a moderate diffuse disc bulge. There is moderate bilateral facet/ligamentous hypertrophy. There is no significant central canal or subarticular zone stenosis. There is mild left foraminal stenosis and no significant right foraminal stenosis.\par         L4-5: There is a small central disc protrusion. There is moderate bilateral facet/ligamentous hypertrophy. There is no significant central canal stenosis. There is mild bilateral subarticular zone stenosis. There is mild bilateral foraminal stenosis.\par         L5-S1: There is a left paracentral disc extrusion with mild inferiorly oriented subligamentous extension superimposed on mild diffuse disc bulge. There is moderate bilateral facet hypertrophy. There is no significant central canal stenosis. There is moderate/severe left subarticular zone stenosis with encroachment upon the left S1 nerve root. There is no significant right subarticular zone stenosis. There is moderate right foraminal stenosis and no significant left foraminal stenosis.\par \par IMPRESSION:\par         Acute/subacute compression fracture of the superior endplate of L1 with no significant retropulsion or central canal stenosis. Acute/subacute compression fracture of the superior endplate of L2 with minimal retropulsion and no significant underlying central canal stenosis. Edema in the partially imaged S2 on S3 vertebra suggest an additional sacral fracture.\par         At L5-S1, a left paracentral disc herniation encroaches upon the left S1 nerve root.\par         Lumbar scoliosis and spondylosis. No significant central canal stenosis. Foraminal stenosis most pronounced at L5-S1 on the right.\par         \par \par  [FreeTextEntry2] : 15

## 2022-12-16 NOTE — ASSESSMENT
[FreeTextEntry1] : >> Imaging and Other Studies\par \par I personally reviewed the relevant imaging.  Discussed and explained to patient the likely source of pathology and pain.  Questions answered. Xr\par \par I personally reviewed the relevant imaging.  Discussed and explained to patient the likely source of pathology and pain.  Questions answered. MRI\par \par >> Therapy and Other Modalities\par \par start PT - referral provided\par \par >> Medications\par  \par Regarding opiate medication to manage pain. I had a detailed discussion with the patient regarding the risks of long-term opioid use, including the potential for medication side effects, hyperaglesia, endocrine dysfunction\par highly recommend transition of tylenol 3 to just tylenol\par \par Severe lower back pain with axial movement.  Pain is refractory to conservative treatment including tylenol and exercises.  Patient unable to move because of the pain and unable to perform adls.  Because of progressive deterioration secondary to severe pain from vertebral compression fracture with edema demonstrated on imaging, sp T8 vertebral kyphoplasty \par \par \par Compression fracture of lumbar vertebra, non-traumatic, sequela \par compression fracture of L1 and L2 s/p kyphoplasty with significant improvement in pain and function. \par now with chronic L3 vcf \par \par gabapentin 100mg nightly\par acetaminophen 650mg q8h prn pain (caution <3g daily)\par \par \par >> Interventions\par \par Significant component of axial back pain secondary to lumbar spondylosis and facet arthropathy demonstrated on MRI LS.  Pain refractory to conservative treatments.  s/p BILATERAL L3-sacral ala diagnostic medial branch block with significant improvement\par \par Given significant relief from diagnostic lumbar medial branch block of >80%, and continued lumbar facet arthropathy pain and axial back pain, s/p LEFT AND RIGHT  L3-sacral ala medial branch radiofrequency ablation with improvement in axial back pain\par \par Significant component of axial mid back pain secondary to thoracic spondylosis and facet arthropathy demonstrated on MRI LS.  Pain refractory to conservative treatments. sp  RIGHT THORACIC DIAGNOSTIC medial branch block T10, 11, 12 (2 joints, 3 nerves on each side) with significant improvement for 2 weeks\par \par given efficacy of previous intervention that is >80% improvement in pain and improved ability to perform adls, and return of pain despite conservative treatment, will schedule repeat RIGHT THORACIC DIAGNOSTIC medial branch block T10, 11, 12 (2 joints, 3 nerves on each side) with 100% improvement\par \par May consider radiofreqency ablation\par \par patient will consult with prescriber regarding holding coumadin 5 days prior to procedure, check INR day of procedure\par \par \par >> Consults\par \par referral endocrinologist for osteoporosis\par \par >> Discussion of Risks/Benefits/Alternatives\par \par 	>Regarding any scheduled procedures:\par \par I have discussed in detail with the patient that any interventional pain procedure is associated with potential risks.  The procedure may include an injection of steroids and potentially other medications (local anesthetic and normal saline) into the epidural space or surrounding tissue of the spine.  There are significant risks of this procedure which include and are not limited to infection, bleeding, worsening pain, dural puncture leading to postdural puncture headache, nerve damage, spinal cord injury, paralysis, stroke, and death.  \par \par There is a chance that the procedure does not improve their pain.  \par \par There are risks associated with the steroid being absorbed into the body systemically.  These include dysphoria, difficulty sleeping, mood swings and personality changes.  Premenopausal women may notice an irregularity in her menstrual cycle for 2-3 months following the injection.  Steroids can specifically affect patients with hypertension, diabetes, and peptic ulcers.  The procedure may cause a temporary increase in blood pressure and blood pressure, and may adversely affect a peptic ulcer.  Other, more rare complications, include avascular necrosis of joints, glaucoma and worsening of osteoporosis. \par \par I have discussed the risks of the procedure at length with the patient, and the potential benefits of pain relief.  I have offered alternatives to the procedure.  All questions were answered.  \par \par The patient expressed understanding and wishes to proceed with the procedure.\par \par 	>Regarding COVID19 Pandemic: \par \par Any planned interventional pain procedure are scheduled because further delay may cause harm or negative outcome to patient.  The goal in performing this procedure is to avoid deterioration of function, emergency room visits (which increases exposure) and reliance on opioids.  \par \par r/b/a discussed with patient, lack of evidence to conclusively determine whether pain management procedures have any positive or negative impact on the possibility of tracey the virus and/or development of any sequelae. \par \par Patient counselled regarding timing steroid based intervention 2 weeks before or after COVID-19 vaccine administration to avoid any interaction or affect on efficacy of vaccination\par \par Patient demonstrates understanding\par \par Informed patient that risks associated with the COVID-19 infection.  Informed patient steps taken to limit the risks.  We are implementing safety precautions and following protocols consistent with the CDC and state recommendations. All patients and staff will be checked for fever or signs of illness upon entry to the facility. We will limit our steroid dose to the lowest effective therapeutic dose or in some cases steroids will not be injected at all. \par \par Patient agrees to proceed\par \par >> Conclusion\par \par The above diagnosis and treatment plan is medically reasonable and necessary based on the patient encounter \par There were no barriers to communication.\par Informed patient that I would be available for any additional questions.\par Patient was instructed to call with any worsening symptoms including severe pain, new numbness/weakness, or changes in the bowel/bladder function. \par Discussed role of nsaids in pain management and all relevant risks, if patient is continuing to require after 4 weeks the patient should f/u for alternative treatment. \par Instructed patient to maintain pain diary to monitor pain level, mobility, and function.\par \par \par \par \par \par

## 2023-01-12 ENCOUNTER — APPOINTMENT (OUTPATIENT)
Dept: PAIN MANAGEMENT | Facility: CLINIC | Age: 58
End: 2023-01-12
Payer: MEDICARE

## 2023-01-12 VITALS
DIASTOLIC BLOOD PRESSURE: 70 MMHG | BODY MASS INDEX: 22.9 KG/M2 | SYSTOLIC BLOOD PRESSURE: 126 MMHG | HEIGHT: 70 IN | TEMPERATURE: 98 F | WEIGHT: 160 LBS

## 2023-01-12 PROCEDURE — 99213 OFFICE O/P EST LOW 20 MIN: CPT

## 2023-01-12 NOTE — ASSESSMENT
[FreeTextEntry1] : >> Imaging and Other Studies\par \par I personally reviewed the relevant imaging.  Discussed and explained to patient the likely source of pathology and pain.  Questions answered. Xr\par \par I personally reviewed the relevant imaging.  Discussed and explained to patient the likely source of pathology and pain.  Questions answered. MRI\par \par >> Therapy and Other Modalities\par \par start PT - referral provided\par \par >> Medications\par  \par Regarding opiate medication to manage pain. I had a detailed discussion with the patient regarding the risks of long-term opioid use, including the potential for medication side effects, hyperaglesia, endocrine dysfunction\par highly recommend transition of tylenol 3 to just tylenol\par \par Severe lower back pain with axial movement.  Pain is refractory to conservative treatment including tylenol and exercises.  Patient unable to move because of the pain and unable to perform adls.  Because of progressive deterioration secondary to severe pain from vertebral compression fracture with edema demonstrated on imaging, sp T8 vertebral kyphoplasty \par \par \par Compression fracture of lumbar vertebra, non-traumatic, sequela \par compression fracture of L1 and L2 s/p kyphoplasty with significant improvement in pain and function. \par now with chronic L3 vcf \par \par gabapentin 100mg nightly\par acetaminophen 650mg q8h prn pain (caution <3g daily)\par \par \par >> Interventions\par \par Significant component of axial back pain secondary to lumbar spondylosis and facet arthropathy demonstrated on MRI LS.  Pain refractory to conservative treatments.  s/p BILATERAL L3-sacral ala diagnostic medial branch block with significant improvement\par \par Given significant relief from diagnostic lumbar medial branch block of >80%, and continued lumbar facet arthropathy pain and axial back pain, s/p LEFT AND RIGHT  L3-sacral ala medial branch radiofrequency ablation with improvement in axial back pain\par \par Significant component of axial mid back pain secondary to thoracic spondylosis and facet arthropathy demonstrated on MRI LS.  Pain refractory to conservative treatments. sp  RIGHT THORACIC DIAGNOSTIC medial branch block T10, 11, 12 (2 joints, 3 nerves on each side) with significant improvement for 2 weeks\par \par given efficacy of previous intervention that is >80% improvement in pain and improved ability to perform adls, and return of pain despite conservative treatment, sp repeat RIGHT THORACIC DIAGNOSTIC medial branch block T10, 11, 12 (2 joints, 3 nerves on each side) with 100% improvement\par \par May consider radiofreqency ablation\par \par patient will consult with prescriber regarding holding coumadin 5 days prior to procedure, check INR day of procedure\par \par \par >> Consults\par \par fu care with endocrinologist for osteoporosis\par \par >> Discussion of Risks/Benefits/Alternatives\par \par 	>Regarding any scheduled procedures:\par \par I have discussed in detail with the patient that any interventional pain procedure is associated with potential risks.  The procedure may include an injection of steroids and potentially other medications (local anesthetic and normal saline) into the epidural space or surrounding tissue of the spine.  There are significant risks of this procedure which include and are not limited to infection, bleeding, worsening pain, dural puncture leading to postdural puncture headache, nerve damage, spinal cord injury, paralysis, stroke, and death.  \par \par There is a chance that the procedure does not improve their pain.  \par \par There are risks associated with the steroid being absorbed into the body systemically.  These include dysphoria, difficulty sleeping, mood swings and personality changes.  Premenopausal women may notice an irregularity in her menstrual cycle for 2-3 months following the injection.  Steroids can specifically affect patients with hypertension, diabetes, and peptic ulcers.  The procedure may cause a temporary increase in blood pressure and blood pressure, and may adversely affect a peptic ulcer.  Other, more rare complications, include avascular necrosis of joints, glaucoma and worsening of osteoporosis. \par \par I have discussed the risks of the procedure at length with the patient, and the potential benefits of pain relief.  I have offered alternatives to the procedure.  All questions were answered.  \par \par The patient expressed understanding and wishes to proceed with the procedure.\par \par 	>Regarding COVID19 Pandemic: \par \par Any planned interventional pain procedure are scheduled because further delay may cause harm or negative outcome to patient.  The goal in performing this procedure is to avoid deterioration of function, emergency room visits (which increases exposure) and reliance on opioids.  \par \par r/b/a discussed with patient, lack of evidence to conclusively determine whether pain management procedures have any positive or negative impact on the possibility of tracey the virus and/or development of any sequelae. \par \par Patient counselled regarding timing steroid based intervention 2 weeks before or after COVID-19 vaccine administration to avoid any interaction or affect on efficacy of vaccination\par \par Patient demonstrates understanding\par \par Informed patient that risks associated with the COVID-19 infection.  Informed patient steps taken to limit the risks.  We are implementing safety precautions and following protocols consistent with the CDC and state recommendations. All patients and staff will be checked for fever or signs of illness upon entry to the facility. We will limit our steroid dose to the lowest effective therapeutic dose or in some cases steroids will not be injected at all. \par \par Patient agrees to proceed\par \par >> Conclusion\par \par The above diagnosis and treatment plan is medically reasonable and necessary based on the patient encounter \par There were no barriers to communication.\par Informed patient that I would be available for any additional questions.\par Patient was instructed to call with any worsening symptoms including severe pain, new numbness/weakness, or changes in the bowel/bladder function. \par Discussed role of nsaids in pain management and all relevant risks, if patient is continuing to require after 4 weeks the patient should f/u for alternative treatment. \par Instructed patient to maintain pain diary to monitor pain level, mobility, and function.\par \par \par \par \par \par

## 2023-01-12 NOTE — HISTORY OF PRESENT ILLNESS
[1] : 3. What number best describes how, during the past week, pain has interfered with your general activity? 1/10 pain [FreeTextEntry1] : Interval Note:\par \par sp RIGHT THORACIC DIAGNOSTIC medial branch block T10, 11, 12 (2 joints, 3 nerves on each side) \par with 100% improvement in pain.  Patient reports that she is very comfortable.  She continues to take Tylenol 3 at the nursing home however. Overall patient is doing very well and comfortable. denies any worsening numbness, weakness, bowel/bladder dysfunction. \par \par \par HPI\par \par Ms. 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 Suh, hypertension, seizure disorder who sustained a fall in the beginning of 2/2017 and admitted to [facility] for evaluation and treatment of PNA s/p abx therapy upon discharge at [facility] in early 2/2017. As per patient she was at [facility] when she fell from her walker about 1 week ago and felt her back twist. Admitted at [facility] 2/19/17 for lowre back pain found o have acute L1 and L2 compression fracture. Patient is s/p L1 L2 kyphoplasty by me 2/20/17. Discharged to [facility] subacute rehab where patient is currently. Lower back pain has significantly improved since kyphoplasty. Patient is able to participate in PT. Denies any additional weakness, numbness or fever/chills. Incision is well healed, nonerythematous. Patient does complain of axial/mechanical bilateral lower back pain for many years without any inciting event. Nonradiating, described as aching. Patient takes tramadol 50mg BID for this pain but demonstrates desire to wean secondary to history of seizures.\par        Patient presents with  bilateral lower back pain. The pain has been occuring for many years. The pain frequency is constant with exacerbations. The character of the pain is described as aching. The current pain intensity is 7/10. With activity, the pain intensity increases to  10/10. The pain increases with activity. The pain is decreased by rest, medications. Patient reports that perfoming activities of daily living difficult. \par    History:  \par        Previous nerve block or injection \par \par repeat RIGHT THORACIC DIAGNOSTIC medial branch block T10, 11, 12 (2 joints, 3 nerves on each side) 12/14/22\par  RIGHT THORACIC DIAGNOSTIC T10, 11, 12 medial branch block (2 joints, 3 nerves on each side)\par T8 kyphoplasty 3/3/22\par LEFT AND RIGHT  L3-sacral ala medial branch radiofrequency ablation 10/20/19\par BILATERAL L3-sacral ala diagnostic medial branch block 9/5/19\par         B/L L3-sacral ryan mbb RFA by me 5/16/17 with 100% relief of lower back pain\par         \par         Bilateral L3-sacral ryan mbb by me 3/16/17 with 90% relief of back pain for 15 hours\par         \par         Bilateral L3-sacral ryan mbb by me 4/13/17 with 50% relief of back pain for 8 hours\par         \par         L1 L2 kyphoplasty by me 2/20/17\par \par Imaging studies  \par \par MRI TS 8/22\par \par  There is no new/acute fracture. There is interval kyphoplasty of a previously identified T8\par compression deformity. Vertebral body heights are otherwise maintained.\par \par  There is normal thoracic kyphosis. No subluxation. No prevertebral soft tissue swelling.\par \par  The thoracic cord is normal in size and signal characteristics.\par \par  There is no significant disc pathology within the thoracic spine. No areas of central canal or\par neural foraminal narrowing.\par \par  No significant change in left adrenal adenoma.\par \par \par \par  IMPRESSION:\par \par  There is no new/acute fracture. There is interval kyphoplasty of a previously identified T8\par compression deformity. Vertebral body heights are otherwise maintained.\par \par  No disc herniation, central canal or neural foraminal narrowing.\par \par MRI LS 1/22\par \par Comparison is made to a prior MRI of the lumbar spine performed on 7/9/2019.\par \par  There is normal curvature to the lumbar lordosis. There is mild dextroscoliosis of the lower\par thoracic and upper lumbar spine with apex at L1/L2, unchanged. There are mild chronic compression\par fractures of the L1, L2 and L3 vertebral bodies. The patient is status post kyphoplasty of L1 and\par L2. The remaining vertebral bodies are normal height and configuration. The intervertebral disc\par demonstrate moderate loss of disc height and T2 signal at the L3/L4 and L5/S1 and mild loss at L4/L5\par disc spaces with anterior and minimal posterior osteophyte formation consistent with desiccation and\par degenerative changes. The conus terminates at the L1 level and demonstrates no evidence of abnormal\par signal changes.\par \par  Evaluation of the individual levels demonstrates at the L5/S1 level there is a mild diffuse disc\par bulge and right-sided osteophyte mildly compressing the distal right L5 foraminal exiting nerve\par root. There is a central/left paracentral protrusion flattening the ventral thecal sac and minimally\par contacting the right descending S1 nerve root and compressing and mildly posteriorly displacing the\par left descending S1 nerve root, unchanged. There is moderate to severe right and moderate left\par foraminal narrowing. There is moderate facet and ligamentous hypertrophy. There is left lateral\par recess narrowing, unchanged. There is no evidence of spinal canal stenosis.\par \par  At the L4/L5 level there is a minimal disc bulge contacting the ventral thecal sac, unchanged.\par There is mild bilateral foraminal narrowing. There is mild to moderate facet and ligamentous\par hypertrophy. There is no evidence of spinal canal stenosis.\par \par  At the L3/L4 level there is a mild diffuse disc bulge flattening the ventral thecal sac, unchanged.\par There is minimal right and mild left foraminal narrowing. There is mild facet and ligamentous\par hypertrophy. There is no evidence of spinal canal stenosis.\par \par  At the L2/L3 level there is a minimal diffuse disc bulge contacting the ventral thecal sac,\par unchanged. The bilateral neuroforamen are patent. There is mild facet degenerative changes. There is\par no evidence of spinal canal stenosis.\par \par  At the L1/L2 and T12/L1 levels there are no evidence of focal disc herniation or spinal canal\par stenosis.\par \par \par  Impression:\par \par  Mild chronic compression fractures of the L1, L2 and L3 vertebral bodies, unchanged. Status post\par post kyphoplasty of L1 and L2, unchanged.\par \par  Degenerative changes of the lumbar spine was notable at L5/S1\par \par  L5/S1  mild diffuse disc bulge and right-sided osteophyte mildly compressing the distal right L5\par foraminal exiting nerve root, unchanged. There is a central/left paracentral protrusion flattening\par the ventral thecal sac and minimally contacting the right descending S1 nerve root and compressing\par and mildly posteriorly displacing the left descending S1 nerve root, unchanged. L5/S1 no evidence of\par spinal canal stenosis.\par \par MRI TS 1/22\par \par  MRI of the thoracic spine was performed utilizing sagittal  T1, axial and sagittal T2-\par weighted and axial gradient echo images as well as sagittal STIR images.\par \par  There are no prior studies available for comparison.\par \par  Findings: There is moderate loss of height of the T8 vertebral body with a small band of STIR\par signal hyperintensity seen at the superior endplate; findings are consistent with an acute to\par subacute moderate T8 compression fracture. There is no evidence of retropulsion of fracture\par fragments.\par \par  There is kyphosis of the thoracic spine. There is a Schmorl's node seen at the superior endplate of\par T2 inferior endplate of T8. The remaining vertebral bodies are of normal height and configuration.\par The intervertebral discs spaces demonstrate mild loss of disc height and T2 signal within the T1/T2,\par T2/T3, T5/T6, T6/T7, T7/T8 and T8/T9 consistent with desiccation. There is an approximately 0.6 cm\par focus of T1, T2 and STIR signal marked hypointensity that may represent a small bone island at the\par superior endplate of T9. Evaluation of the spinal cord demonstrates no evidence of abnormal signal\par changes.\par \par  Evaluation of the individual levels demonstrates no evidence of a focal disc herniation or spinal\par cord compression.\par \par  There is an approximately 1.2 cm rounded soft tissue nodule along the left adrenal gland; for\par further evaluation, would recommend CT or MRI utilizing adrenal gland protocol.\par \par \par  Impression:\par \par  Moderate acute to subacute compression fracture of the superior endplate of T8.\par \par  No evidence of focal disc herniation or spinal cord compression.\par \par \par  Approximately 1.2 cm rounded soft tissue nodule along the left adrenal gland; for further\par evaluation, would recommend CT or MRI utilizing adrenal gland protocol.\par \par \par MRI LS 7/2019\par \par There is a mild levoscoliosis of the lower lumbar spine. A levoscoliosis of the visualized thoracic  \par spine is noted. The lumbar lordosis and alignment is grossly maintained. The marrow signal is   \par overall within normal limits with no focal marrow replacing lesion identified.  \par  \par  There has been interval kyphoplasty of previously seen L1 and L2 compression fractures. There is   \par mild loss of height of the vertebral bodies with no significant retropulsion or underlying central   \par canal stenosis. There is additionally a mild chronic compression fracture of the superior endplate   \par which has developed in the interval. There is no associated vertebral edema. No acute fracture is   \par identified. There is mild degenerative endplate edema at L5-S1. Otherwise no abnormal vertebral or   \par paraspinal edema is appreciated. The visualized paraspinal soft tissues appear grossly unremarkable.  \par  \par  The conus medullaris terminates at L2 and the thecal sac terminates at S2. No abnormal signal is   \par identified in the visualized spinal cord.  \par  \par  L1-2: Mild disc bulge and mild ligamentous hypertrophy. No significant central canal, subarticular,  \par or foraminal stenosis. No significant interval change.  \par  \par  L2-3: Mild disc bulge and mild ligamentous hypertrophy. No significant central canal, subarticular,  \par or foraminal stenosis. Mild increase in disc bulging associated with the compression of superior   \par endplate of L3. No significant increase in stenosis.  \par  \par  L3-4: Mild disc bulge. Mild facet/ligamentous hypertrophy. No significant central canal or   \par subarticular stenosis. Mild/moderate left foraminal stenosis and no significant right foraminal   \par stenosis. No significant interval change.  \par  \par  L4-5: Minimal disc bulge and mild facet/ligamentous hypertrophy. No significant central canal or   \par subarticular stenosis. Mild bilateral foraminal stenosis. Previously seen central disc protrusion is  \par no longer appreciated. Otherwise no significant interval change.  \par  \par  L5-S1: Small left paracentral disc extrusion with inferiorly oriented extension superimposed on a   \par mild disc bulge. No significant central canal stenosis. Moderate left subarticular stenosis with   \par encroachment upon the left S1 nerve root. No significant right subarticular stenosis. Moderate right  \par foraminal stenosis and no significant left foraminal stenosis. No significant interval change.  \par  \par  \par  \par  IMPRESSION:  \par  \par  No acute fracture. Chronic compression fractures of L1, L2, and L3, with kyphoplasty cement in L1   \par and L2. Chronic L3 compression fracture has developed since 2/20/2017.  \par  \par  Lumbar spondylosis. Left paracentral disc herniation at L5-S1 encroaches upon the left S1 nerve   \par root. No significant central canal stenosis. Foraminal stenosis as above, most pronounced at L5-S1   \par the right. Overall no significant change in stenosis when compared to 2/20/2017.  \par \par         MRI LS 2/20/17\par \par There is a moderate dextroscoliosis of the lower thoracic and upper lumbar spine with apex at the approximate L1-2 level. The normal lumbar lordosis and alignment is maintained. The marrow signal is overall within normal limits with no focal marrow replacing lesion identified.\par         There is a mild compression deformity of the superior endplate of L1 with no significant retropulsion or underlying central canal stenosis. There is a mild compression deformity of the superior endplate of L2 with minimal retropulsion and no significant underlying central canal stenosis. There is edema associated with both these fractures consistent with acute to subacute fractures. In the visualized sacrum, there is edema at the S2 and S3 vertebral bodies which is nonspecific, possibly reflecting an additional sacral fracture. No definite cortical break was seen in this region on recent CT of the lumbar spine.\par         The visualized paraspinal soft tissues appear grossly unremarkable. The conus medullaris terminates at L2 and the thecal sac terminates at S1-2. No abnormal signal is identified in the visualized spinal cord.\par         T12-L1: There is no significant disc bulge or herniation. There is bilateral facet hypertrophy. There is no significant central canal, subarticular, or foraminal stenosis.\par         L1-2: There is minimal retropulsion of the superior endplate of L2. There is minimal disc bulging. There is moderate bilateral facet/ligamentous hypertrophy. There is no significant central canal, subarticular, or foraminal zone stenosis.\par         L2-3: There is no significant disc bulge or herniation. There is moderate bilateral facet/ligamentous hypertrophy. There is no significant central canal, subarticular, or foraminal zone stenosis.\par         L3-4: There is a moderate diffuse disc bulge. There is moderate bilateral facet/ligamentous hypertrophy. There is no significant central canal or subarticular zone stenosis. There is mild left foraminal stenosis and no significant right foraminal stenosis.\par         L4-5: There is a small central disc protrusion. There is moderate bilateral facet/ligamentous hypertrophy. There is no significant central canal stenosis. There is mild bilateral subarticular zone stenosis. There is mild bilateral foraminal stenosis.\par         L5-S1: There is a left paracentral disc extrusion with mild inferiorly oriented subligamentous extension superimposed on mild diffuse disc bulge. There is moderate bilateral facet hypertrophy. There is no significant central canal stenosis. There is moderate/severe left subarticular zone stenosis with encroachment upon the left S1 nerve root. There is no significant right subarticular zone stenosis. There is moderate right foraminal stenosis and no significant left foraminal stenosis.\par \par IMPRESSION:\par         Acute/subacute compression fracture of the superior endplate of L1 with no significant retropulsion or central canal stenosis. Acute/subacute compression fracture of the superior endplate of L2 with minimal retropulsion and no significant underlying central canal stenosis. Edema in the partially imaged S2 on S3 vertebra suggest an additional sacral fracture.\par         At L5-S1, a left paracentral disc herniation encroaches upon the left S1 nerve root.\par         Lumbar scoliosis and spondylosis. No significant central canal stenosis. Foraminal stenosis most pronounced at L5-S1 on the right.\par         \par \par  [FreeTextEntry2] : 3

## 2023-02-07 ENCOUNTER — APPOINTMENT (OUTPATIENT)
Dept: PAIN MANAGEMENT | Facility: CLINIC | Age: 58
End: 2023-02-07

## 2023-02-09 ENCOUNTER — APPOINTMENT (OUTPATIENT)
Dept: PAIN MANAGEMENT | Facility: CLINIC | Age: 58
End: 2023-02-09
Payer: MEDICARE

## 2023-02-09 VITALS
SYSTOLIC BLOOD PRESSURE: 138 MMHG | BODY MASS INDEX: 22.9 KG/M2 | DIASTOLIC BLOOD PRESSURE: 76 MMHG | HEIGHT: 70 IN | WEIGHT: 160 LBS | TEMPERATURE: 98 F

## 2023-02-09 PROCEDURE — 99214 OFFICE O/P EST MOD 30 MIN: CPT

## 2023-02-09 NOTE — PHYSICAL EXAM
[Normal] : Well developed, in no acute distress, alert and oriented to person, place and time [Facet Tenderness] : facet tenderness [Wheelchair] : uses a wheelchair

## 2023-02-09 NOTE — ASSESSMENT
[FreeTextEntry1] : >> Imaging and Other Studies\par \par I personally reviewed the relevant imaging.  Discussed and explained to patient the likely source of pathology and pain.  Questions answered. Xr\par \par I personally reviewed the relevant imaging.  Discussed and explained to patient the likely source of pathology and pain.  Questions answered. MRI\par \par >> Therapy and Other Modalities\par \par start PT - referral provided\par \par >> Medications\par  \par Regarding opiate medication to manage pain. I had a detailed discussion with the patient regarding the risks of long-term opioid use, including the potential for medication side effects, hyperaglesia, endocrine dysfunction\par highly recommend transition of tylenol 3 to just tylenol\par \par Severe lower back pain with axial movement.  Pain is refractory to conservative treatment including tylenol and exercises.  Patient unable to move because of the pain and unable to perform adls.  Because of progressive deterioration secondary to severe pain from vertebral compression fracture with edema demonstrated on imaging, sp T8 vertebral kyphoplasty \par \par \par Compression fracture of lumbar vertebra, non-traumatic, sequela \par compression fracture of L1 and L2 s/p kyphoplasty with significant improvement in pain and function. \par now with chronic L3 vcf \par \par gabapentin 100mg nightly\par acetaminophen 650mg q8h prn pain (caution <3g daily)\par \par \par >> Interventions\par \par Significant component of axial back pain secondary to lumbar spondylosis and facet arthropathy demonstrated on MRI LS.  Pain refractory to conservative treatments.  s/p BILATERAL L3-sacral ala diagnostic medial branch block with significant improvement\par \par Given significant relief from diagnostic lumbar medial branch block of >80%, and continued lumbar facet arthropathy pain and axial back pain, s/p LEFT AND RIGHT  L3-sacral ala medial branch radiofrequency ablation with improvement in axial back pain\par \par Significant component of axial mid back pain secondary to thoracic spondylosis and facet arthropathy demonstrated on MRI LS.  Pain refractory to conservative treatments. sp  RIGHT THORACIC DIAGNOSTIC medial branch block T10, 11, 12 (2 joints, 3 nerves on each side) with significant improvement for 2 weeks\par \par given efficacy of previous intervention that is >80% improvement in pain and improved ability to perform adls, and return of pain despite conservative treatment, sp repeat RIGHT THORACIC DIAGNOSTIC medial branch block T10, 11, 12 (2 joints, 3 nerves on each side) with 100% improvement\par \par Given significant relief from diagnostic lumbar medial branch block of >80%, and significant improvement in function (as measured by KINA) and continued lumbar facet arthropathy pain (demonstrated on imaging) and axial back pain, will RIGHT THORACIC DIAGNOSTIC medial branch block T10, 11, 12 (2 joints, 3 nerves on each side) radiofrequency ablation at 80C for 2:30 min r/b/a discussed\par \par \par May consider radiofreqency ablation\par \par patient will consult with prescriber regarding holding coumadin 5 days prior to procedure, check INR day of procedure\par \par \par >> Consults\par \par fu care with endocrinologist for osteoporosis\par \par >> Discussion of Risks/Benefits/Alternatives\par \par 	>Regarding any scheduled procedures:\par \par I have discussed in detail with the patient that any interventional pain procedure is associated with potential risks.  The procedure may include an injection of steroids and potentially other medications (local anesthetic and normal saline) into the epidural space or surrounding tissue of the spine.  There are significant risks of this procedure which include and are not limited to infection, bleeding, worsening pain, dural puncture leading to postdural puncture headache, nerve damage, spinal cord injury, paralysis, stroke, and death.  \par \par There is a chance that the procedure does not improve their pain.  \par \par There are risks associated with the steroid being absorbed into the body systemically.  These include dysphoria, difficulty sleeping, mood swings and personality changes.  Premenopausal women may notice an irregularity in her menstrual cycle for 2-3 months following the injection.  Steroids can specifically affect patients with hypertension, diabetes, and peptic ulcers.  The procedure may cause a temporary increase in blood pressure and blood pressure, and may adversely affect a peptic ulcer.  Other, more rare complications, include avascular necrosis of joints, glaucoma and worsening of osteoporosis. \par \par I have discussed the risks of the procedure at length with the patient, and the potential benefits of pain relief.  I have offered alternatives to the procedure.  All questions were answered.  \par \par The patient expressed understanding and wishes to proceed with the procedure.\par \par 	>Regarding COVID19 Pandemic: \par \par Any planned interventional pain procedure are scheduled because further delay may cause harm or negative outcome to patient.  The goal in performing this procedure is to avoid deterioration of function, emergency room visits (which increases exposure) and reliance on opioids.  \par \par r/b/a discussed with patient, lack of evidence to conclusively determine whether pain management procedures have any positive or negative impact on the possibility of tracey the virus and/or development of any sequelae. \par \par Patient counselled regarding timing steroid based intervention 2 weeks before or after COVID-19 vaccine administration to avoid any interaction or affect on efficacy of vaccination\par \par Patient demonstrates understanding\par \par Informed patient that risks associated with the COVID-19 infection.  Informed patient steps taken to limit the risks.  We are implementing safety precautions and following protocols consistent with the CDC and state recommendations. All patients and staff will be checked for fever or signs of illness upon entry to the facility. We will limit our steroid dose to the lowest effective therapeutic dose or in some cases steroids will not be injected at all. \par \par Patient agrees to proceed\par \par >> Conclusion\par \par The above diagnosis and treatment plan is medically reasonable and necessary based on the patient encounter \par There were no barriers to communication.\par Informed patient that I would be available for any additional questions.\par Patient was instructed to call with any worsening symptoms including severe pain, new numbness/weakness, or changes in the bowel/bladder function. \par Discussed role of nsaids in pain management and all relevant risks, if patient is continuing to require after 4 weeks the patient should f/u for alternative treatment. \par Instructed patient to maintain pain diary to monitor pain level, mobility, and function.\par \par \par \par \par \par

## 2023-02-09 NOTE — HISTORY OF PRESENT ILLNESS
[1] : 3. What number best describes how, during the past week, pain has interfered with your general activity? 1/10 pain [FreeTextEntry1] : Interval Note:\par \par sp RIGHT THORACIC DIAGNOSTIC medial branch block T10, 11, 12 (2 joints, 3 nerves on each side) \par with 100% improvement in pain.  but pain has returned.  Patient reports that she had migraines and seen in the hospital now on antiepileptic.  She continues to take Tylenol 3 at the nursing home however. denies any worsening numbness, weakness, bowel/bladder dysfunction. \par \par \par HPI\par \par Ms. 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 Suh, hypertension, seizure disorder who sustained a fall in the beginning of 2/2017 and admitted to [facility] for evaluation and treatment of PNA s/p abx therapy upon discharge at [facility] in early 2/2017. As per patient she was at [facility] when she fell from her walker about 1 week ago and felt her back twist. Admitted at [facility] 2/19/17 for lowre back pain found o have acute L1 and L2 compression fracture. Patient is s/p L1 L2 kyphoplasty by me 2/20/17. Discharged to [facility] subacute rehab where patient is currently. Lower back pain has significantly improved since kyphoplasty. Patient is able to participate in PT. Denies any additional weakness, numbness or fever/chills. Incision is well healed, nonerythematous. Patient does complain of axial/mechanical bilateral lower back pain for many years without any inciting event. Nonradiating, described as aching. Patient takes tramadol 50mg BID for this pain but demonstrates desire to wean secondary to history of seizures.\par        Patient presents with  bilateral lower back pain. The pain has been occuring for many years. The pain frequency is constant with exacerbations. The character of the pain is described as aching. The current pain intensity is 7/10. With activity, the pain intensity increases to  10/10. The pain increases with activity. The pain is decreased by rest, medications. Patient reports that perfoming activities of daily living difficult. \par    History:  \par        Previous nerve block or injection \par \par repeat RIGHT THORACIC DIAGNOSTIC medial branch block T10, 11, 12 (2 joints, 3 nerves on each side) 12/14/22\par  RIGHT THORACIC DIAGNOSTIC T10, 11, 12 medial branch block (2 joints, 3 nerves on each side)\par T8 kyphoplasty 3/3/22\par LEFT AND RIGHT  L3-sacral ala medial branch radiofrequency ablation 10/20/19\par BILATERAL L3-sacral ala diagnostic medial branch block 9/5/19\par         B/L L3-sacral ryan mbb RFA by me 5/16/17 with 100% relief of lower back pain\par         \par         Bilateral L3-sacral ryan mbb by me 3/16/17 with 90% relief of back pain for 15 hours\par         \par         Bilateral L3-sacral ryan mbb by me 4/13/17 with 50% relief of back pain for 8 hours\par         \par         L1 L2 kyphoplasty by me 2/20/17\par \par Imaging studies  \par \par MRI TS 8/22\par \par  There is no new/acute fracture. There is interval kyphoplasty of a previously identified T8\par compression deformity. Vertebral body heights are otherwise maintained.\par \par  There is normal thoracic kyphosis. No subluxation. No prevertebral soft tissue swelling.\par \par  The thoracic cord is normal in size and signal characteristics.\par \par  There is no significant disc pathology within the thoracic spine. No areas of central canal or\par neural foraminal narrowing.\par \par  No significant change in left adrenal adenoma.\par \par \par \par  IMPRESSION:\par \par  There is no new/acute fracture. There is interval kyphoplasty of a previously identified T8\par compression deformity. Vertebral body heights are otherwise maintained.\par \par  No disc herniation, central canal or neural foraminal narrowing.\par \par MRI LS 1/22\par \par Comparison is made to a prior MRI of the lumbar spine performed on 7/9/2019.\par \par  There is normal curvature to the lumbar lordosis. There is mild dextroscoliosis of the lower\par thoracic and upper lumbar spine with apex at L1/L2, unchanged. There are mild chronic compression\par fractures of the L1, L2 and L3 vertebral bodies. The patient is status post kyphoplasty of L1 and\par L2. The remaining vertebral bodies are normal height and configuration. The intervertebral disc\par demonstrate moderate loss of disc height and T2 signal at the L3/L4 and L5/S1 and mild loss at L4/L5\par disc spaces with anterior and minimal posterior osteophyte formation consistent with desiccation and\par degenerative changes. The conus terminates at the L1 level and demonstrates no evidence of abnormal\par signal changes.\par \par  Evaluation of the individual levels demonstrates at the L5/S1 level there is a mild diffuse disc\par bulge and right-sided osteophyte mildly compressing the distal right L5 foraminal exiting nerve\par root. There is a central/left paracentral protrusion flattening the ventral thecal sac and minimally\par contacting the right descending S1 nerve root and compressing and mildly posteriorly displacing the\par left descending S1 nerve root, unchanged. There is moderate to severe right and moderate left\par foraminal narrowing. There is moderate facet and ligamentous hypertrophy. There is left lateral\par recess narrowing, unchanged. There is no evidence of spinal canal stenosis.\par \par  At the L4/L5 level there is a minimal disc bulge contacting the ventral thecal sac, unchanged.\par There is mild bilateral foraminal narrowing. There is mild to moderate facet and ligamentous\par hypertrophy. There is no evidence of spinal canal stenosis.\par \par  At the L3/L4 level there is a mild diffuse disc bulge flattening the ventral thecal sac, unchanged.\par There is minimal right and mild left foraminal narrowing. There is mild facet and ligamentous\par hypertrophy. There is no evidence of spinal canal stenosis.\par \par  At the L2/L3 level there is a minimal diffuse disc bulge contacting the ventral thecal sac,\par unchanged. The bilateral neuroforamen are patent. There is mild facet degenerative changes. There is\par no evidence of spinal canal stenosis.\par \par  At the L1/L2 and T12/L1 levels there are no evidence of focal disc herniation or spinal canal\par stenosis.\par \par \par  Impression:\par \par  Mild chronic compression fractures of the L1, L2 and L3 vertebral bodies, unchanged. Status post\par post kyphoplasty of L1 and L2, unchanged.\par \par  Degenerative changes of the lumbar spine was notable at L5/S1\par \par  L5/S1  mild diffuse disc bulge and right-sided osteophyte mildly compressing the distal right L5\par foraminal exiting nerve root, unchanged. There is a central/left paracentral protrusion flattening\par the ventral thecal sac and minimally contacting the right descending S1 nerve root and compressing\par and mildly posteriorly displacing the left descending S1 nerve root, unchanged. L5/S1 no evidence of\par spinal canal stenosis.\par \par MRI TS 1/22\par \par  MRI of the thoracic spine was performed utilizing sagittal  T1, axial and sagittal T2-\par weighted and axial gradient echo images as well as sagittal STIR images.\par \par  There are no prior studies available for comparison.\par \par  Findings: There is moderate loss of height of the T8 vertebral body with a small band of STIR\par signal hyperintensity seen at the superior endplate; findings are consistent with an acute to\par subacute moderate T8 compression fracture. There is no evidence of retropulsion of fracture\par fragments.\par \par  There is kyphosis of the thoracic spine. There is a Schmorl's node seen at the superior endplate of\par T2 inferior endplate of T8. The remaining vertebral bodies are of normal height and configuration.\par The intervertebral discs spaces demonstrate mild loss of disc height and T2 signal within the T1/T2,\par T2/T3, T5/T6, T6/T7, T7/T8 and T8/T9 consistent with desiccation. There is an approximately 0.6 cm\par focus of T1, T2 and STIR signal marked hypointensity that may represent a small bone island at the\par superior endplate of T9. Evaluation of the spinal cord demonstrates no evidence of abnormal signal\par changes.\par \par  Evaluation of the individual levels demonstrates no evidence of a focal disc herniation or spinal\par cord compression.\par \par  There is an approximately 1.2 cm rounded soft tissue nodule along the left adrenal gland; for\par further evaluation, would recommend CT or MRI utilizing adrenal gland protocol.\par \par \par  Impression:\par \par  Moderate acute to subacute compression fracture of the superior endplate of T8.\par \par  No evidence of focal disc herniation or spinal cord compression.\par \par \par  Approximately 1.2 cm rounded soft tissue nodule along the left adrenal gland; for further\par evaluation, would recommend CT or MRI utilizing adrenal gland protocol.\par \par \par MRI LS 7/2019\par \par There is a mild levoscoliosis of the lower lumbar spine. A levoscoliosis of the visualized thoracic  \par spine is noted. The lumbar lordosis and alignment is grossly maintained. The marrow signal is   \par overall within normal limits with no focal marrow replacing lesion identified.  \par  \par  There has been interval kyphoplasty of previously seen L1 and L2 compression fractures. There is   \par mild loss of height of the vertebral bodies with no significant retropulsion or underlying central   \par canal stenosis. There is additionally a mild chronic compression fracture of the superior endplate   \par which has developed in the interval. There is no associated vertebral edema. No acute fracture is   \par identified. There is mild degenerative endplate edema at L5-S1. Otherwise no abnormal vertebral or   \par paraspinal edema is appreciated. The visualized paraspinal soft tissues appear grossly unremarkable.  \par  \par  The conus medullaris terminates at L2 and the thecal sac terminates at S2. No abnormal signal is   \par identified in the visualized spinal cord.  \par  \par  L1-2: Mild disc bulge and mild ligamentous hypertrophy. No significant central canal, subarticular,  \par or foraminal stenosis. No significant interval change.  \par  \par  L2-3: Mild disc bulge and mild ligamentous hypertrophy. No significant central canal, subarticular,  \par or foraminal stenosis. Mild increase in disc bulging associated with the compression of superior   \par endplate of L3. No significant increase in stenosis.  \par  \par  L3-4: Mild disc bulge. Mild facet/ligamentous hypertrophy. No significant central canal or   \par subarticular stenosis. Mild/moderate left foraminal stenosis and no significant right foraminal   \par stenosis. No significant interval change.  \par  \par  L4-5: Minimal disc bulge and mild facet/ligamentous hypertrophy. No significant central canal or   \par subarticular stenosis. Mild bilateral foraminal stenosis. Previously seen central disc protrusion is  \par no longer appreciated. Otherwise no significant interval change.  \par  \par  L5-S1: Small left paracentral disc extrusion with inferiorly oriented extension superimposed on a   \par mild disc bulge. No significant central canal stenosis. Moderate left subarticular stenosis with   \par encroachment upon the left S1 nerve root. No significant right subarticular stenosis. Moderate right  \par foraminal stenosis and no significant left foraminal stenosis. No significant interval change.  \par  \par  \par  \par  IMPRESSION:  \par  \par  No acute fracture. Chronic compression fractures of L1, L2, and L3, with kyphoplasty cement in L1   \par and L2. Chronic L3 compression fracture has developed since 2/20/2017.  \par  \par  Lumbar spondylosis. Left paracentral disc herniation at L5-S1 encroaches upon the left S1 nerve   \par root. No significant central canal stenosis. Foraminal stenosis as above, most pronounced at L5-S1   \par the right. Overall no significant change in stenosis when compared to 2/20/2017.  \par \par         MRI LS 2/20/17\par \par There is a moderate dextroscoliosis of the lower thoracic and upper lumbar spine with apex at the approximate L1-2 level. The normal lumbar lordosis and alignment is maintained. The marrow signal is overall within normal limits with no focal marrow replacing lesion identified.\par         There is a mild compression deformity of the superior endplate of L1 with no significant retropulsion or underlying central canal stenosis. There is a mild compression deformity of the superior endplate of L2 with minimal retropulsion and no significant underlying central canal stenosis. There is edema associated with both these fractures consistent with acute to subacute fractures. In the visualized sacrum, there is edema at the S2 and S3 vertebral bodies which is nonspecific, possibly reflecting an additional sacral fracture. No definite cortical break was seen in this region on recent CT of the lumbar spine.\par         The visualized paraspinal soft tissues appear grossly unremarkable. The conus medullaris terminates at L2 and the thecal sac terminates at S1-2. No abnormal signal is identified in the visualized spinal cord.\par         T12-L1: There is no significant disc bulge or herniation. There is bilateral facet hypertrophy. There is no significant central canal, subarticular, or foraminal stenosis.\par         L1-2: There is minimal retropulsion of the superior endplate of L2. There is minimal disc bulging. There is moderate bilateral facet/ligamentous hypertrophy. There is no significant central canal, subarticular, or foraminal zone stenosis.\par         L2-3: There is no significant disc bulge or herniation. There is moderate bilateral facet/ligamentous hypertrophy. There is no significant central canal, subarticular, or foraminal zone stenosis.\par         L3-4: There is a moderate diffuse disc bulge. There is moderate bilateral facet/ligamentous hypertrophy. There is no significant central canal or subarticular zone stenosis. There is mild left foraminal stenosis and no significant right foraminal stenosis.\par         L4-5: There is a small central disc protrusion. There is moderate bilateral facet/ligamentous hypertrophy. There is no significant central canal stenosis. There is mild bilateral subarticular zone stenosis. There is mild bilateral foraminal stenosis.\par         L5-S1: There is a left paracentral disc extrusion with mild inferiorly oriented subligamentous extension superimposed on mild diffuse disc bulge. There is moderate bilateral facet hypertrophy. There is no significant central canal stenosis. There is moderate/severe left subarticular zone stenosis with encroachment upon the left S1 nerve root. There is no significant right subarticular zone stenosis. There is moderate right foraminal stenosis and no significant left foraminal stenosis.\par \par IMPRESSION:\par         Acute/subacute compression fracture of the superior endplate of L1 with no significant retropulsion or central canal stenosis. Acute/subacute compression fracture of the superior endplate of L2 with minimal retropulsion and no significant underlying central canal stenosis. Edema in the partially imaged S2 on S3 vertebra suggest an additional sacral fracture.\par         At L5-S1, a left paracentral disc herniation encroaches upon the left S1 nerve root.\par         Lumbar scoliosis and spondylosis. No significant central canal stenosis. Foraminal stenosis most pronounced at L5-S1 on the right.\par         \par \par  [FreeTextEntry2] : 3

## 2023-03-09 ENCOUNTER — APPOINTMENT (OUTPATIENT)
Dept: PAIN MANAGEMENT | Facility: HOSPITAL | Age: 58
End: 2023-03-09

## 2023-03-09 ENCOUNTER — TRANSCRIPTION ENCOUNTER (OUTPATIENT)
Age: 58
End: 2023-03-09

## 2023-03-15 ENCOUNTER — APPOINTMENT (OUTPATIENT)
Dept: PAIN MANAGEMENT | Facility: HOSPITAL | Age: 58
End: 2023-03-15

## 2023-03-22 ENCOUNTER — APPOINTMENT (OUTPATIENT)
Dept: PAIN MANAGEMENT | Facility: CLINIC | Age: 58
End: 2023-03-22
Payer: MEDICARE

## 2023-03-22 ENCOUNTER — RESULT REVIEW (OUTPATIENT)
Age: 58
End: 2023-03-22

## 2023-03-22 VITALS
WEIGHT: 160 LBS | BODY MASS INDEX: 22.9 KG/M2 | SYSTOLIC BLOOD PRESSURE: 140 MMHG | DIASTOLIC BLOOD PRESSURE: 82 MMHG | HEIGHT: 70 IN | TEMPERATURE: 98 F

## 2023-03-22 PROCEDURE — 99214 OFFICE O/P EST MOD 30 MIN: CPT

## 2023-03-22 NOTE — HISTORY OF PRESENT ILLNESS
[3] : 3. What number best describes how, during the past week, pain has interfered with your general activity? 3/10 pain [FreeTextEntry1] : Interval Note:\par s/p right thoracic (T10, 11, 12)  radiofreqency ablation on 3/9/23 with significant improvement in pain. Today feels well.\par \par c/o RIGHT knee pain for which she has been using Tylenol #3.   Pain is so bad that patient finds it difficult to perform adls and ambulate, especially given that patient is unable to use left leg. \par \par \par \par HPI\par \par Ms. NORMA DOLAN is a 58 year F with pmhx of with history of iodinated contrast allergy with nausea and vomiting (no anaphylaxis), antiphospholipid syndrome, cva 31 years ago with left sided facial droop, left UE and LE weakness on coumadin for >5 years managed by Dr. Suh, hypertension, seizure disorder who sustained a fall in the beginning of 2/2017 and admitted to [facility] for evaluation and treatment of PNA s/p abx therapy upon discharge at [facility] in early 2/2017. As per patient she was at [facility] when she fell from her walker about 1 week ago and felt her back twist. Admitted at [facility] 2/19/17 for lower back pain found o have acute L1 and L2 compression fracture. Patient is s/p L1 L2 kyphoplasty by me 2/20/17. Discharged to [facility] subacute rehab where patient is currently. Lower back pain has significantly improved since kyphoplasty. Patient is able to participate in PT. Denies any additional weakness, numbness or fever/chills. Incision is well healed, nonerythematous. Patient does complain of axial/mechanical bilateral lower back pain for many years without any inciting event. Nonradiating, described as aching. Patient takes tramadol 50mg BID for this pain but demonstrates desire to wean secondary to history of seizures.\par        Patient presents with  bilateral lower back pain. The pain has been occurring for many years. The pain frequency is constant with exacerbations. The character of the pain is described as aching. The current pain intensity is 7/10. With activity, the pain intensity increases to  10/10. The pain increases with activity. The pain is decreased by rest, medications. Patient reports that performing activities of daily living difficult. \par    History:  \par \par Previous nerve block or injection \par right thoracic (T10, 11, 12)  radiofreqency ablation on 3/9/23\par \par repeat RIGHT THORACIC DIAGNOSTIC medial branch block T10, 11, 12 (2 joints, 3 nerves on each side) 12/14/22\par  RIGHT THORACIC DIAGNOSTIC T10, 11, 12 medial branch block (2 joints, 3 nerves on each side)\par T8 kyphoplasty 3/3/22\par LEFT AND RIGHT  L3-sacral ala medial branch radiofrequency ablation 10/20/19\par BILATERAL L3-sacral ala diagnostic medial branch block 9/5/19\par         B/L L3-sacral ryan mbb RFA by me 5/16/17 with 100% relief of lower back pain\par         \par         Bilateral L3-sacral ryan mbb by me 3/16/17 with 90% relief of back pain for 15 hours\par         \par         Bilateral L3-sacral ryan mbb by me 4/13/17 with 50% relief of back pain for 8 hours\par         \par         L1 L2 kyphoplasty by me 2/20/17\par \par Imaging studies  \par \par MRI TS 8/22\par \par  There is no new/acute fracture. There is interval kyphoplasty of a previously identified T8\par compression deformity. Vertebral body heights are otherwise maintained.\par \par  There is normal thoracic kyphosis. No subluxation. No prevertebral soft tissue swelling.\par \par  The thoracic cord is normal in size and signal characteristics.\par \par  There is no significant disc pathology within the thoracic spine. No areas of central canal or\par neural foraminal narrowing.\par \par  No significant change in left adrenal adenoma.\par \par \par \par  IMPRESSION:\par \par  There is no new/acute fracture. There is interval kyphoplasty of a previously identified T8\par compression deformity. Vertebral body heights are otherwise maintained.\par \par  No disc herniation, central canal or neural foraminal narrowing.\par \par MRI LS 1/22\par \par Comparison is made to a prior MRI of the lumbar spine performed on 7/9/2019.\par \par  There is normal curvature to the lumbar lordosis. There is mild dextroscoliosis of the lower\par thoracic and upper lumbar spine with apex at L1/L2, unchanged. There are mild chronic compression\par fractures of the L1, L2 and L3 vertebral bodies. The patient is status post kyphoplasty of L1 and\par L2. The remaining vertebral bodies are normal height and configuration. The intervertebral disc\par demonstrate moderate loss of disc height and T2 signal at the L3/L4 and L5/S1 and mild loss at L4/L5\par disc spaces with anterior and minimal posterior osteophyte formation consistent with desiccation and\par degenerative changes. The conus terminates at the L1 level and demonstrates no evidence of abnormal\par signal changes.\par \par  Evaluation of the individual levels demonstrates at the L5/S1 level there is a mild diffuse disc\par bulge and right-sided osteophyte mildly compressing the distal right L5 foraminal exiting nerve\par root. There is a central/left paracentral protrusion flattening the ventral thecal sac and minimally\par contacting the right descending S1 nerve root and compressing and mildly posteriorly displacing the\par left descending S1 nerve root, unchanged. There is moderate to severe right and moderate left\par foraminal narrowing. There is moderate facet and ligamentous hypertrophy. There is left lateral\par recess narrowing, unchanged. There is no evidence of spinal canal stenosis.\par \par  At the L4/L5 level there is a minimal disc bulge contacting the ventral thecal sac, unchanged.\par There is mild bilateral foraminal narrowing. There is mild to moderate facet and ligamentous\par hypertrophy. There is no evidence of spinal canal stenosis.\par \par  At the L3/L4 level there is a mild diffuse disc bulge flattening the ventral thecal sac, unchanged.\par There is minimal right and mild left foraminal narrowing. There is mild facet and ligamentous\par hypertrophy. There is no evidence of spinal canal stenosis.\par \par  At the L2/L3 level there is a minimal diffuse disc bulge contacting the ventral thecal sac,\par unchanged. The bilateral neuroforamen are patent. There is mild facet degenerative changes. There is\par no evidence of spinal canal stenosis.\par \par  At the L1/L2 and T12/L1 levels there are no evidence of focal disc herniation or spinal canal\par stenosis.\par \par \par  Impression:\par \par  Mild chronic compression fractures of the L1, L2 and L3 vertebral bodies, unchanged. Status post\par post kyphoplasty of L1 and L2, unchanged.\par \par  Degenerative changes of the lumbar spine was notable at L5/S1\par \par  L5/S1  mild diffuse disc bulge and right-sided osteophyte mildly compressing the distal right L5\par foraminal exiting nerve root, unchanged. There is a central/left paracentral protrusion flattening\par the ventral thecal sac and minimally contacting the right descending S1 nerve root and compressing\par and mildly posteriorly displacing the left descending S1 nerve root, unchanged. L5/S1 no evidence of\par spinal canal stenosis.\par \par MRI TS 1/22\par \par  MRI of the thoracic spine was performed utilizing sagittal  T1, axial and sagittal T2-\par weighted and axial gradient echo images as well as sagittal STIR images.\par \par  There are no prior studies available for comparison.\par \par  Findings: There is moderate loss of height of the T8 vertebral body with a small band of STIR\par signal hyperintensity seen at the superior endplate; findings are consistent with an acute to\par subacute moderate T8 compression fracture. There is no evidence of retropulsion of fracture\par fragments.\par \par  There is kyphosis of the thoracic spine. There is a Schmorl's node seen at the superior endplate of\par T2 inferior endplate of T8. The remaining vertebral bodies are of normal height and configuration.\par The intervertebral discs spaces demonstrate mild loss of disc height and T2 signal within the T1/T2,\par T2/T3, T5/T6, T6/T7, T7/T8 and T8/T9 consistent with desiccation. There is an approximately 0.6 cm\par focus of T1, T2 and STIR signal marked hypointensity that may represent a small bone island at the\par superior endplate of T9. Evaluation of the spinal cord demonstrates no evidence of abnormal signal\par changes.\par \par  Evaluation of the individual levels demonstrates no evidence of a focal disc herniation or spinal\par cord compression.\par \par  There is an approximately 1.2 cm rounded soft tissue nodule along the left adrenal gland; for\par further evaluation, would recommend CT or MRI utilizing adrenal gland protocol.\par \par \par  Impression:\par \par  Moderate acute to subacute compression fracture of the superior endplate of T8.\par \par  No evidence of focal disc herniation or spinal cord compression.\par \par \par  Approximately 1.2 cm rounded soft tissue nodule along the left adrenal gland; for further\par evaluation, would recommend CT or MRI utilizing adrenal gland protocol.\par \par \par MRI LS 7/2019\par \par There is a mild levoscoliosis of the lower lumbar spine. A levoscoliosis of the visualized thoracic  \par spine is noted. The lumbar lordosis and alignment is grossly maintained. The marrow signal is   \par overall within normal limits with no focal marrow replacing lesion identified.  \par  \par  There has been interval kyphoplasty of previously seen L1 and L2 compression fractures. There is   \par mild loss of height of the vertebral bodies with no significant retropulsion or underlying central   \par canal stenosis. There is additionally a mild chronic compression fracture of the superior endplate   \par which has developed in the interval. There is no associated vertebral edema. No acute fracture is   \par identified. There is mild degenerative endplate edema at L5-S1. Otherwise no abnormal vertebral or   \par paraspinal edema is appreciated. The visualized paraspinal soft tissues appear grossly unremarkable.  \par  \par  The conus medullaris terminates at L2 and the thecal sac terminates at S2. No abnormal signal is   \par identified in the visualized spinal cord.  \par  \par  L1-2: Mild disc bulge and mild ligamentous hypertrophy. No significant central canal, subarticular,  \par or foraminal stenosis. No significant interval change.  \par  \par  L2-3: Mild disc bulge and mild ligamentous hypertrophy. No significant central canal, subarticular,  \par or foraminal stenosis. Mild increase in disc bulging associated with the compression of superior   \par endplate of L3. No significant increase in stenosis.  \par  \par  L3-4: Mild disc bulge. Mild facet/ligamentous hypertrophy. No significant central canal or   \par subarticular stenosis. Mild/moderate left foraminal stenosis and no significant right foraminal   \par stenosis. No significant interval change.  \par  \par  L4-5: Minimal disc bulge and mild facet/ligamentous hypertrophy. No significant central canal or   \par subarticular stenosis. Mild bilateral foraminal stenosis. Previously seen central disc protrusion is  \par no longer appreciated. Otherwise no significant interval change.  \par  \par  L5-S1: Small left paracentral disc extrusion with inferiorly oriented extension superimposed on a   \par mild disc bulge. No significant central canal stenosis. Moderate left subarticular stenosis with   \par encroachment upon the left S1 nerve root. No significant right subarticular stenosis. Moderate right  \par foraminal stenosis and no significant left foraminal stenosis. No significant interval change.  \par  \par  \par  \par  IMPRESSION:  \par  \par  No acute fracture. Chronic compression fractures of L1, L2, and L3, with kyphoplasty cement in L1   \par and L2. Chronic L3 compression fracture has developed since 2/20/2017.  \par  \par  Lumbar spondylosis. Left paracentral disc herniation at L5-S1 encroaches upon the left S1 nerve   \par root. No significant central canal stenosis. Foraminal stenosis as above, most pronounced at L5-S1   \par the right. Overall no significant change in stenosis when compared to 2/20/2017.  \par \par         MRI LS 2/20/17\par \par There is a moderate dextroscoliosis of the lower thoracic and upper lumbar spine with apex at the approximate L1-2 level. The normal lumbar lordosis and alignment is maintained. The marrow signal is overall within normal limits with no focal marrow replacing lesion identified.\par         There is a mild compression deformity of the superior endplate of L1 with no significant retropulsion or underlying central canal stenosis. There is a mild compression deformity of the superior endplate of L2 with minimal retropulsion and no significant underlying central canal stenosis. There is edema associated with both these fractures consistent with acute to subacute fractures. In the visualized sacrum, there is edema at the S2 and S3 vertebral bodies which is nonspecific, possibly reflecting an additional sacral fracture. No definite cortical break was seen in this region on recent CT of the lumbar spine.\par         The visualized paraspinal soft tissues appear grossly unremarkable. The conus medullaris terminates at L2 and the thecal sac terminates at S1-2. No abnormal signal is identified in the visualized spinal cord.\par         T12-L1: There is no significant disc bulge or herniation. There is bilateral facet hypertrophy. There is no significant central canal, subarticular, or foraminal stenosis.\par         L1-2: There is minimal retropulsion of the superior endplate of L2. There is minimal disc bulging. There is moderate bilateral facet/ligamentous hypertrophy. There is no significant central canal, subarticular, or foraminal zone stenosis.\par         L2-3: There is no significant disc bulge or herniation. There is moderate bilateral facet/ligamentous hypertrophy. There is no significant central canal, subarticular, or foraminal zone stenosis.\par         L3-4: There is a moderate diffuse disc bulge. There is moderate bilateral facet/ligamentous hypertrophy. There is no significant central canal or subarticular zone stenosis. There is mild left foraminal stenosis and no significant right foraminal stenosis.\par         L4-5: There is a small central disc protrusion. There is moderate bilateral facet/ligamentous hypertrophy. There is no significant central canal stenosis. There is mild bilateral subarticular zone stenosis. There is mild bilateral foraminal stenosis.\par         L5-S1: There is a left paracentral disc extrusion with mild inferiorly oriented subligamentous extension superimposed on mild diffuse disc bulge. There is moderate bilateral facet hypertrophy. There is no significant central canal stenosis. There is moderate/severe left subarticular zone stenosis with encroachment upon the left S1 nerve root. There is no significant right subarticular zone stenosis. There is moderate right foraminal stenosis and no significant left foraminal stenosis.\par \par IMPRESSION:\par         Acute/subacute compression fracture of the superior endplate of L1 with no significant retropulsion or central canal stenosis. Acute/subacute compression fracture of the superior endplate of L2 with minimal retropulsion and no significant underlying central canal stenosis. Edema in the partially imaged S2 on S3 vertebra suggest an additional sacral fracture.\par         At L5-S1, a left paracentral disc herniation encroaches upon the left S1 nerve root.\par         Lumbar scoliosis and spondylosis. No significant central canal stenosis. Foraminal stenosis most pronounced at L5-S1 on the right.\par         \par \par  [FreeTextEntry2] : 9

## 2023-03-22 NOTE — ASSESSMENT
[FreeTextEntry1] : >> Imaging and Other Studies\par \par I personally reviewed the relevant imaging.  Discussed and explained to patient the likely source of pathology and pain.  Questions answered. Xr\par \par I personally reviewed the relevant imaging.  Discussed and explained to patient the likely source of pathology and pain.  Questions answered. MRI\par \par XR R knee\par \par >> Therapy and Other Modalities\par \par start PT - referral provided\par \par >> Medications\par  \par Regarding opiate medication to manage pain. I had a detailed discussion with the patient regarding the risks of long-term opioid use, including the potential for medication side effects, hyperaglesia, endocrine dysfunction\par highly recommend transition of tylenol 3 to just tylenol\par \par Severe lower back pain with axial movement.  Pain is refractory to conservative treatment including tylenol and exercises.  Patient unable to move because of the pain and unable to perform adls.  Because of progressive deterioration secondary to severe pain from vertebral compression fracture with edema demonstrated on imaging, sp T8 vertebral kyphoplasty \par \par \par Compression fracture of lumbar vertebra, non-traumatic, sequela \par compression fracture of L1 and L2 s/p kyphoplasty with significant improvement in pain and function. \par now with chronic L3 vcf \par \par gabapentin 100mg nightly\par acetaminophen 650mg q8h prn pain (caution <3g daily)\par \par \par >> Interventions\par \par Significant component of axial back pain secondary to lumbar spondylosis and facet arthropathy demonstrated on MRI LS.  Pain refractory to conservative treatments.  s/p BILATERAL L3-sacral ala diagnostic medial branch block with significant improvement\par \par Given significant relief from diagnostic lumbar medial branch block of >80%, and continued lumbar facet arthropathy pain and axial back pain, s/p LEFT AND RIGHT  L3-sacral ala medial branch radiofrequency ablation with improvement in axial back pain\par \par Significant component of axial mid back pain secondary to thoracic spondylosis and facet arthropathy demonstrated on MRI LS.  Pain refractory to conservative treatments. sp  RIGHT THORACIC DIAGNOSTIC medial branch block T10, 11, 12 (2 joints, 3 nerves on each side) with significant improvement for 2 weeks\par \par given efficacy of previous intervention that is >80% improvement in pain and improved ability to perform adls, and return of pain despite conservative treatment, sp repeat RIGHT THORACIC DIAGNOSTIC medial branch block T10, 11, 12 (2 joints, 3 nerves on each side) with 100% improvement\par \par Given significant relief from diagnostic lumbar medial branch block of >80%, and significant improvement in function (as measured by KINA) and continued lumbar facet arthropathy pain (demonstrated on imaging) and axial back pain, sp RIGHT THORACIC DIAGNOSTIC medial branch block T10, 11, 12 (2 joints, 3 nerves on each side) radiofrequency ablation at 80C for 2:30 min\par \par Progressive right knee pain secondary to osteoarthritis refractory to conservative treatments, will schedule right knee steroid injection r/b/a discussed\par \par \par \par \par >> Consults\par \par fu care with endocrinologist for osteoporosis\par \par >> Discussion of Risks/Benefits/Alternatives\par \par 	>Regarding any scheduled procedures:\par \par I have discussed in detail with the patient that any interventional pain procedure is associated with potential risks.  The procedure may include an injection of steroids and potentially other medications (local anesthetic and normal saline) into the epidural space or surrounding tissue of the spine.  There are significant risks of this procedure which include and are not limited to infection, bleeding, worsening pain, dural puncture leading to postdural puncture headache, nerve damage, spinal cord injury, paralysis, stroke, and death.  \par \par There is a chance that the procedure does not improve their pain.  \par \par There are risks associated with the steroid being absorbed into the body systemically.  These include dysphoria, difficulty sleeping, mood swings and personality changes.  Premenopausal women may notice an irregularity in her menstrual cycle for 2-3 months following the injection.  Steroids can specifically affect patients with hypertension, diabetes, and peptic ulcers.  The procedure may cause a temporary increase in blood pressure and blood pressure, and may adversely affect a peptic ulcer.  Other, more rare complications, include avascular necrosis of joints, glaucoma and worsening of osteoporosis. \par \par I have discussed the risks of the procedure at length with the patient, and the potential benefits of pain relief.  I have offered alternatives to the procedure.  All questions were answered.  \par \par The patient expressed understanding and wishes to proceed with the procedure.\par \par 	>Regarding COVID19 Pandemic: \par \par Any planned interventional pain procedure are scheduled because further delay may cause harm or negative outcome to patient.  The goal in performing this procedure is to avoid deterioration of function, emergency room visits (which increases exposure) and reliance on opioids.  \par \par r/b/a discussed with patient, lack of evidence to conclusively determine whether pain management procedures have any positive or negative impact on the possibility of tracey the virus and/or development of any sequelae. \par \par Patient counselled regarding timing steroid based intervention 2 weeks before or after COVID-19 vaccine administration to avoid any interaction or affect on efficacy of vaccination\par \par Patient demonstrates understanding\par \par Informed patient that risks associated with the COVID-19 infection.  Informed patient steps taken to limit the risks.  We are implementing safety precautions and following protocols consistent with the CDC and state recommendations. All patients and staff will be checked for fever or signs of illness upon entry to the facility. We will limit our steroid dose to the lowest effective therapeutic dose or in some cases steroids will not be injected at all. \par \par Patient agrees to proceed\par \par >> Conclusion\par \par The above diagnosis and treatment plan is medically reasonable and necessary based on the patient encounter \par There were no barriers to communication.\par Informed patient that I would be available for any additional questions.\par Patient was instructed to call with any worsening symptoms including severe pain, new numbness/weakness, or changes in the bowel/bladder function. \par Discussed role of nsaids in pain management and all relevant risks, if patient is continuing to require after 4 weeks the patient should f/u for alternative treatment. \par Instructed patient to maintain pain diary to monitor pain level, mobility, and function.\par \par \par \par \par \par

## 2023-04-07 ENCOUNTER — APPOINTMENT (OUTPATIENT)
Dept: PAIN MANAGEMENT | Facility: CLINIC | Age: 58
End: 2023-04-07
Payer: MEDICARE

## 2023-04-07 VITALS
SYSTOLIC BLOOD PRESSURE: 125 MMHG | WEIGHT: 160 LBS | HEIGHT: 70 IN | BODY MASS INDEX: 22.9 KG/M2 | DIASTOLIC BLOOD PRESSURE: 86 MMHG

## 2023-04-07 PROCEDURE — 20611 DRAIN/INJ JOINT/BURSA W/US: CPT | Mod: RT

## 2023-04-07 RX ADMIN — Medication %: at 00:00

## 2023-04-07 RX ADMIN — BUPIVACAINE HYDROCHLORIDE 0 %: 7.5 INJECTION, SOLUTION EPIDURAL; RETROBULBAR at 00:00

## 2023-04-07 RX ADMIN — TRIAMCINOLONE ACETONIDE MG/ML: 10 INJECTION, SUSPENSION INTRA-ARTICULAR; INTRALESIONAL at 00:00

## 2023-04-07 NOTE — PROCEDURE
[FreeTextEntry1] : KNEE INJECTION ULTRASOUND RIGHT\par \par The patient was given opportunity to ask questions regarding the procedure, its indications and the associated risks.  The risks of the procedure discussed include but are not limited to infection, bleeding, allergic reactions, nerve injuries, and side effects.  The patient showed understanding and wished to proceed.\par \par After obtaining informed consent, the patient's chart was reviewed, the site of operation was marked, and the patient's identification was confirmed.  The patient was brought to the Room and placed in the supine position on the room table   No skin lesions or signs of cellulitis were noted.  Strict sterile technique was used.  Skin was prepped with Chlorhexadine solution/\par \par Using sterile technique, a high-frequency, linear-array ultrasound probe was placed over the suprapatellar recess in a longitudinal orientation and the suprapatellar bursa was thus identified.  The probe was then rotated to a transverse orientation over the bursa and fluid in the band and the vastus lateralus by palpation.  Skin overlying this region was anesthetized using a [30]g needle and [1]cc 1% Lidocaine.  Following this, a [25]g  needle was inserted using an in-plane technique with the ultrasound such that the needle shaft and tip were visualized entering the suprapatellar bursa. There was no heme on aspiration.  No paresthesias were elicited throughout.  Following this a mixture of 20mg kenalog, 4cc 0.5% Bupivacaine was injected after negative intermittent heme aspirations.  There was no pain on injection.  The needle was then flushed with lidocaine and removed.  A band-aid dressing was applied.\par \par The patient tolerated the procedure well.  \par \par The patient was instructed to call with fever, chills, increased pain, redness or swelling at the injection site, weakness, numbness or weakness in the leg.\par

## 2023-04-20 ENCOUNTER — APPOINTMENT (OUTPATIENT)
Dept: PAIN MANAGEMENT | Facility: CLINIC | Age: 58
End: 2023-04-20
Payer: MEDICARE

## 2023-04-20 VITALS
SYSTOLIC BLOOD PRESSURE: 156 MMHG | DIASTOLIC BLOOD PRESSURE: 97 MMHG | BODY MASS INDEX: 22.9 KG/M2 | WEIGHT: 160 LBS | HEIGHT: 70 IN

## 2023-04-20 PROCEDURE — 99214 OFFICE O/P EST MOD 30 MIN: CPT

## 2023-04-20 RX ORDER — DICLOFENAC SODIUM 1 %
1 KIT TOPICAL
Qty: 1 | Refills: 1 | Status: ACTIVE | COMMUNITY
Start: 2023-04-20

## 2023-04-20 NOTE — HISTORY OF PRESENT ILLNESS
[4] : 3. What number best describes how, during the past week, pain has interfered with your general activity? 4/10 pain [FreeTextEntry1] : Interval Note:\par \par sp  right knee steroid injection 4/7/23 with improvement in knee pain.  \par s/p right thoracic (T10, 11, 12)  radiofreqency ablation on 3/9/23 with significant improvement in pain. Today feels well.\par \par Overall doing well.\par Continues tylenol #3 BID prn\par Complains of bilateral shoulder and elbow pain at times.  Affecting adls and tender.  \par \par \par HPI\par \par Ms. NORMA DOLAN is a 58 year F with pmhx of with history of iodinated contrast allergy with nausea and vomiting (no anaphylaxis), antiphospholipid syndrome, cva 31 years ago with left sided facial droop, left UE and LE weakness on coumadin for >5 years managed by Dr. Suh, hypertension, seizure disorder who sustained a fall in the beginning of 2/2017 and admitted to [facility] for evaluation and treatment of PNA s/p abx therapy upon discharge at [facility] in early 2/2017. As per patient she was at [facility] when she fell from her walker about 1 week ago and felt her back twist. Admitted at [facility] 2/19/17 for lower back pain found o have acute L1 and L2 compression fracture. Patient is s/p L1 L2 kyphoplasty by me 2/20/17. Discharged to [facility] subacute rehab where patient is currently. Lower back pain has significantly improved since kyphoplasty. Patient is able to participate in PT. Denies any additional weakness, numbness or fever/chills. Incision is well healed, nonerythematous. Patient does complain of axial/mechanical bilateral lower back pain for many years without any inciting event. Nonradiating, described as aching. Patient takes tramadol 50mg BID for this pain but demonstrates desire to wean secondary to history of seizures.\par        Patient presents with  bilateral lower back pain. The pain has been occurring for many years. The pain frequency is constant with exacerbations. The character of the pain is described as aching. The current pain intensity is 7/10. With activity, the pain intensity increases to  10/10. The pain increases with activity. The pain is decreased by rest, medications. Patient reports that performing activities of daily living difficult. \par    History:  \par \par Previous nerve block or injection \par \par   right knee steroid injection 4/7/23\par right thoracic (T10, 11, 12)  radiofreqency ablation on 3/9/23\par \par repeat RIGHT THORACIC DIAGNOSTIC medial branch block T10, 11, 12 (2 joints, 3 nerves on each side) 12/14/22\par  RIGHT THORACIC DIAGNOSTIC T10, 11, 12 medial branch block (2 joints, 3 nerves on each side)\par T8 kyphoplasty 3/3/22\par LEFT AND RIGHT  L3-sacral ala medial branch radiofrequency ablation 10/20/19\par BILATERAL L3-sacral ala diagnostic medial branch block 9/5/19\par         B/L L3-sacral ryan mbb RFA by me 5/16/17 with 100% relief of lower back pain\par         \par         Bilateral L3-sacral ryan mbb by me 3/16/17 with 90% relief of back pain for 15 hours\par         \par         Bilateral L3-sacral ryan mbb by me 4/13/17 with 50% relief of back pain for 8 hours\par         \par         L1 L2 kyphoplasty by me 2/20/17\par \par Imaging studies  \par \par MRI TS 8/22\par \par  There is no new/acute fracture. There is interval kyphoplasty of a previously identified T8\par compression deformity. Vertebral body heights are otherwise maintained.\par \par  There is normal thoracic kyphosis. No subluxation. No prevertebral soft tissue swelling.\par \par  The thoracic cord is normal in size and signal characteristics.\par \par  There is no significant disc pathology within the thoracic spine. No areas of central canal or\par neural foraminal narrowing.\par \par  No significant change in left adrenal adenoma.\par \par \par \par  IMPRESSION:\par \par  There is no new/acute fracture. There is interval kyphoplasty of a previously identified T8\par compression deformity. Vertebral body heights are otherwise maintained.\par \par  No disc herniation, central canal or neural foraminal narrowing.\par \par MRI LS 1/22\par \par Comparison is made to a prior MRI of the lumbar spine performed on 7/9/2019.\par \par  There is normal curvature to the lumbar lordosis. There is mild dextroscoliosis of the lower\par thoracic and upper lumbar spine with apex at L1/L2, unchanged. There are mild chronic compression\par fractures of the L1, L2 and L3 vertebral bodies. The patient is status post kyphoplasty of L1 and\par L2. The remaining vertebral bodies are normal height and configuration. The intervertebral disc\par demonstrate moderate loss of disc height and T2 signal at the L3/L4 and L5/S1 and mild loss at L4/L5\par disc spaces with anterior and minimal posterior osteophyte formation consistent with desiccation and\par degenerative changes. The conus terminates at the L1 level and demonstrates no evidence of abnormal\par signal changes.\par \par  Evaluation of the individual levels demonstrates at the L5/S1 level there is a mild diffuse disc\par bulge and right-sided osteophyte mildly compressing the distal right L5 foraminal exiting nerve\par root. There is a central/left paracentral protrusion flattening the ventral thecal sac and minimally\par contacting the right descending S1 nerve root and compressing and mildly posteriorly displacing the\par left descending S1 nerve root, unchanged. There is moderate to severe right and moderate left\par foraminal narrowing. There is moderate facet and ligamentous hypertrophy. There is left lateral\par recess narrowing, unchanged. There is no evidence of spinal canal stenosis.\par \par  At the L4/L5 level there is a minimal disc bulge contacting the ventral thecal sac, unchanged.\par There is mild bilateral foraminal narrowing. There is mild to moderate facet and ligamentous\par hypertrophy. There is no evidence of spinal canal stenosis.\par \par  At the L3/L4 level there is a mild diffuse disc bulge flattening the ventral thecal sac, unchanged.\par There is minimal right and mild left foraminal narrowing. There is mild facet and ligamentous\par hypertrophy. There is no evidence of spinal canal stenosis.\par \par  At the L2/L3 level there is a minimal diffuse disc bulge contacting the ventral thecal sac,\par unchanged. The bilateral neuroforamen are patent. There is mild facet degenerative changes. There is\par no evidence of spinal canal stenosis.\par \par  At the L1/L2 and T12/L1 levels there are no evidence of focal disc herniation or spinal canal\par stenosis.\par \par \par  Impression:\par \par  Mild chronic compression fractures of the L1, L2 and L3 vertebral bodies, unchanged. Status post\par post kyphoplasty of L1 and L2, unchanged.\par \par  Degenerative changes of the lumbar spine was notable at L5/S1\par \par  L5/S1  mild diffuse disc bulge and right-sided osteophyte mildly compressing the distal right L5\par foraminal exiting nerve root, unchanged. There is a central/left paracentral protrusion flattening\par the ventral thecal sac and minimally contacting the right descending S1 nerve root and compressing\par and mildly posteriorly displacing the left descending S1 nerve root, unchanged. L5/S1 no evidence of\par spinal canal stenosis.\par \par MRI TS 1/22\par \par  MRI of the thoracic spine was performed utilizing sagittal  T1, axial and sagittal T2-\par weighted and axial gradient echo images as well as sagittal STIR images.\par \par  There are no prior studies available for comparison.\par \par  Findings: There is moderate loss of height of the T8 vertebral body with a small band of STIR\par signal hyperintensity seen at the superior endplate; findings are consistent with an acute to\par subacute moderate T8 compression fracture. There is no evidence of retropulsion of fracture\par fragments.\par \par  There is kyphosis of the thoracic spine. There is a Schmorl's node seen at the superior endplate of\par T2 inferior endplate of T8. The remaining vertebral bodies are of normal height and configuration.\par The intervertebral discs spaces demonstrate mild loss of disc height and T2 signal within the T1/T2,\par T2/T3, T5/T6, T6/T7, T7/T8 and T8/T9 consistent with desiccation. There is an approximately 0.6 cm\par focus of T1, T2 and STIR signal marked hypointensity that may represent a small bone island at the\par superior endplate of T9. Evaluation of the spinal cord demonstrates no evidence of abnormal signal\par changes.\par \par  Evaluation of the individual levels demonstrates no evidence of a focal disc herniation or spinal\par cord compression.\par \par  There is an approximately 1.2 cm rounded soft tissue nodule along the left adrenal gland; for\par further evaluation, would recommend CT or MRI utilizing adrenal gland protocol.\par \par \par  Impression:\par \par  Moderate acute to subacute compression fracture of the superior endplate of T8.\par \par  No evidence of focal disc herniation or spinal cord compression.\par \par \par  Approximately 1.2 cm rounded soft tissue nodule along the left adrenal gland; for further\par evaluation, would recommend CT or MRI utilizing adrenal gland protocol.\par \par \par MRI LS 7/2019\par \par There is a mild levoscoliosis of the lower lumbar spine. A levoscoliosis of the visualized thoracic  \par spine is noted. The lumbar lordosis and alignment is grossly maintained. The marrow signal is   \par overall within normal limits with no focal marrow replacing lesion identified.  \par  \par  There has been interval kyphoplasty of previously seen L1 and L2 compression fractures. There is   \par mild loss of height of the vertebral bodies with no significant retropulsion or underlying central   \par canal stenosis. There is additionally a mild chronic compression fracture of the superior endplate   \par which has developed in the interval. There is no associated vertebral edema. No acute fracture is   \par identified. There is mild degenerative endplate edema at L5-S1. Otherwise no abnormal vertebral or   \par paraspinal edema is appreciated. The visualized paraspinal soft tissues appear grossly unremarkable.  \par  \par  The conus medullaris terminates at L2 and the thecal sac terminates at S2. No abnormal signal is   \par identified in the visualized spinal cord.  \par  \par  L1-2: Mild disc bulge and mild ligamentous hypertrophy. No significant central canal, subarticular,  \par or foraminal stenosis. No significant interval change.  \par  \par  L2-3: Mild disc bulge and mild ligamentous hypertrophy. No significant central canal, subarticular,  \par or foraminal stenosis. Mild increase in disc bulging associated with the compression of superior   \par endplate of L3. No significant increase in stenosis.  \par  \par  L3-4: Mild disc bulge. Mild facet/ligamentous hypertrophy. No significant central canal or   \par subarticular stenosis. Mild/moderate left foraminal stenosis and no significant right foraminal   \par stenosis. No significant interval change.  \par  \par  L4-5: Minimal disc bulge and mild facet/ligamentous hypertrophy. No significant central canal or   \par subarticular stenosis. Mild bilateral foraminal stenosis. Previously seen central disc protrusion is  \par no longer appreciated. Otherwise no significant interval change.  \par  \par  L5-S1: Small left paracentral disc extrusion with inferiorly oriented extension superimposed on a   \par mild disc bulge. No significant central canal stenosis. Moderate left subarticular stenosis with   \par encroachment upon the left S1 nerve root. No significant right subarticular stenosis. Moderate right  \par foraminal stenosis and no significant left foraminal stenosis. No significant interval change.  \par  \par  \par  \par  IMPRESSION:  \par  \par  No acute fracture. Chronic compression fractures of L1, L2, and L3, with kyphoplasty cement in L1   \par and L2. Chronic L3 compression fracture has developed since 2/20/2017.  \par  \par  Lumbar spondylosis. Left paracentral disc herniation at L5-S1 encroaches upon the left S1 nerve   \par root. No significant central canal stenosis. Foraminal stenosis as above, most pronounced at L5-S1   \par the right. Overall no significant change in stenosis when compared to 2/20/2017.  \par \par         MRI LS 2/20/17\par \par There is a moderate dextroscoliosis of the lower thoracic and upper lumbar spine with apex at the approximate L1-2 level. The normal lumbar lordosis and alignment is maintained. The marrow signal is overall within normal limits with no focal marrow replacing lesion identified.\par         There is a mild compression deformity of the superior endplate of L1 with no significant retropulsion or underlying central canal stenosis. There is a mild compression deformity of the superior endplate of L2 with minimal retropulsion and no significant underlying central canal stenosis. There is edema associated with both these fractures consistent with acute to subacute fractures. In the visualized sacrum, there is edema at the S2 and S3 vertebral bodies which is nonspecific, possibly reflecting an additional sacral fracture. No definite cortical break was seen in this region on recent CT of the lumbar spine.\par         The visualized paraspinal soft tissues appear grossly unremarkable. The conus medullaris terminates at L2 and the thecal sac terminates at S1-2. No abnormal signal is identified in the visualized spinal cord.\par         T12-L1: There is no significant disc bulge or herniation. There is bilateral facet hypertrophy. There is no significant central canal, subarticular, or foraminal stenosis.\par         L1-2: There is minimal retropulsion of the superior endplate of L2. There is minimal disc bulging. There is moderate bilateral facet/ligamentous hypertrophy. There is no significant central canal, subarticular, or foraminal zone stenosis.\par         L2-3: There is no significant disc bulge or herniation. There is moderate bilateral facet/ligamentous hypertrophy. There is no significant central canal, subarticular, or foraminal zone stenosis.\par         L3-4: There is a moderate diffuse disc bulge. There is moderate bilateral facet/ligamentous hypertrophy. There is no significant central canal or subarticular zone stenosis. There is mild left foraminal stenosis and no significant right foraminal stenosis.\par         L4-5: There is a small central disc protrusion. There is moderate bilateral facet/ligamentous hypertrophy. There is no significant central canal stenosis. There is mild bilateral subarticular zone stenosis. There is mild bilateral foraminal stenosis.\par         L5-S1: There is a left paracentral disc extrusion with mild inferiorly oriented subligamentous extension superimposed on mild diffuse disc bulge. There is moderate bilateral facet hypertrophy. There is no significant central canal stenosis. There is moderate/severe left subarticular zone stenosis with encroachment upon the left S1 nerve root. There is no significant right subarticular zone stenosis. There is moderate right foraminal stenosis and no significant left foraminal stenosis.\par \par IMPRESSION:\par         Acute/subacute compression fracture of the superior endplate of L1 with no significant retropulsion or central canal stenosis. Acute/subacute compression fracture of the superior endplate of L2 with minimal retropulsion and no significant underlying central canal stenosis. Edema in the partially imaged S2 on S3 vertebra suggest an additional sacral fracture.\par         At L5-S1, a left paracentral disc herniation encroaches upon the left S1 nerve root.\par         Lumbar scoliosis and spondylosis. No significant central canal stenosis. Foraminal stenosis most pronounced at L5-S1 on the right.\par         \par \par  [FreeTextEntry2] : 12

## 2023-04-20 NOTE — ASSESSMENT
[FreeTextEntry1] : >> Imaging and Other Studies\par \par I personally reviewed the relevant imaging.  Discussed and explained to patient the likely source of pathology and pain.  Questions answered. Xr\par \par I personally reviewed the relevant imaging.  Discussed and explained to patient the likely source of pathology and pain.  Questions answered. MRI\par \par XR R knee\par \par >> Therapy and Other Modalities\par \par start PT - referral provided\par \par >> Medications\par  \par trial voltaren gel q4 prn pain\par \par Regarding opiate medication to manage pain. I had a detailed discussion with the patient regarding the risks of long-term opioid use, including the potential for medication side effects, hyperaglesia, endocrine dysfunction\par highly recommend transition of tylenol 3 to just tylenol\par \par Severe lower back pain with axial movement.  Pain is refractory to conservative treatment including tylenol and exercises.  Patient unable to move because of the pain and unable to perform adls.  Because of progressive deterioration secondary to severe pain from vertebral compression fracture with edema demonstrated on imaging, sp T8 vertebral kyphoplasty \par \par \par Compression fracture of lumbar vertebra, non-traumatic, sequela \par compression fracture of L1 and L2 s/p kyphoplasty with significant improvement in pain and function. \par now with chronic L3 vcf \par \par gabapentin 100mg nightly\par acetaminophen 650mg q8h prn pain (caution <3g daily)\par \par \par >> Interventions\par \par Significant component of axial back pain secondary to lumbar spondylosis and facet arthropathy demonstrated on MRI LS.  Pain refractory to conservative treatments.  s/p BILATERAL L3-sacral ala diagnostic medial branch block with significant improvement\par \par Given significant relief from diagnostic lumbar medial branch block of >80%, and continued lumbar facet arthropathy pain and axial back pain, s/p LEFT AND RIGHT  L3-sacral ala medial branch radiofrequency ablation with improvement in axial back pain\par \par Significant component of axial mid back pain secondary to thoracic spondylosis and facet arthropathy demonstrated on MRI LS.  Pain refractory to conservative treatments. sp  RIGHT THORACIC DIAGNOSTIC medial branch block T10, 11, 12 (2 joints, 3 nerves on each side) with significant improvement for 2 weeks\par \par given efficacy of previous intervention that is >80% improvement in pain and improved ability to perform adls, and return of pain despite conservative treatment, sp repeat RIGHT THORACIC DIAGNOSTIC medial branch block T10, 11, 12 (2 joints, 3 nerves on each side) with 100% improvement\par \par Given significant relief from diagnostic lumbar medial branch block of >80%, and significant improvement in function (as measured by KINA) and continued lumbar facet arthropathy pain (demonstrated on imaging) and axial back pain, sp RIGHT THORACIC DIAGNOSTIC medial branch block T10, 11, 12 (2 joints, 3 nerves on each side) radiofrequency ablation at 80C for 2:30 min\par \par Progressive right knee pain secondary to osteoarthritis refractory to conservative treatments, will schedule right knee steroid injection r/b/a discussed\par \par \par \par \par >> Consults\par \par fu care with endocrinologist for osteoporosis\par \par >> Discussion of Risks/Benefits/Alternatives\par \par 	>Regarding any scheduled procedures:\par \par I have discussed in detail with the patient that any interventional pain procedure is associated with potential risks.  The procedure may include an injection of steroids and potentially other medications (local anesthetic and normal saline) into the epidural space or surrounding tissue of the spine.  There are significant risks of this procedure which include and are not limited to infection, bleeding, worsening pain, dural puncture leading to postdural puncture headache, nerve damage, spinal cord injury, paralysis, stroke, and death.  \par \par There is a chance that the procedure does not improve their pain.  \par \par There are risks associated with the steroid being absorbed into the body systemically.  These include dysphoria, difficulty sleeping, mood swings and personality changes.  Premenopausal women may notice an irregularity in her menstrual cycle for 2-3 months following the injection.  Steroids can specifically affect patients with hypertension, diabetes, and peptic ulcers.  The procedure may cause a temporary increase in blood pressure and blood pressure, and may adversely affect a peptic ulcer.  Other, more rare complications, include avascular necrosis of joints, glaucoma and worsening of osteoporosis. \par \par I have discussed the risks of the procedure at length with the patient, and the potential benefits of pain relief.  I have offered alternatives to the procedure.  All questions were answered.  \par \par The patient expressed understanding and wishes to proceed with the procedure.\par \par 	>Regarding COVID19 Pandemic: \par \par Any planned interventional pain procedure are scheduled because further delay may cause harm or negative outcome to patient.  The goal in performing this procedure is to avoid deterioration of function, emergency room visits (which increases exposure) and reliance on opioids.  \par \par r/b/a discussed with patient, lack of evidence to conclusively determine whether pain management procedures have any positive or negative impact on the possibility of tracey the virus and/or development of any sequelae. \par \par Patient counselled regarding timing steroid based intervention 2 weeks before or after COVID-19 vaccine administration to avoid any interaction or affect on efficacy of vaccination\par \par Patient demonstrates understanding\par \par Informed patient that risks associated with the COVID-19 infection.  Informed patient steps taken to limit the risks.  We are implementing safety precautions and following protocols consistent with the CDC and state recommendations. All patients and staff will be checked for fever or signs of illness upon entry to the facility. We will limit our steroid dose to the lowest effective therapeutic dose or in some cases steroids will not be injected at all. \par \par Patient agrees to proceed\par \par >> Conclusion\par \par The above diagnosis and treatment plan is medically reasonable and necessary based on the patient encounter \par There were no barriers to communication.\par Informed patient that I would be available for any additional questions.\par Patient was instructed to call with any worsening symptoms including severe pain, new numbness/weakness, or changes in the bowel/bladder function. \par Discussed role of nsaids in pain management and all relevant risks, if patient is continuing to require after 4 weeks the patient should f/u for alternative treatment. \par Instructed patient to maintain pain diary to monitor pain level, mobility, and function.\par \par \par \par \par \par

## 2023-05-19 ENCOUNTER — APPOINTMENT (OUTPATIENT)
Dept: PAIN MANAGEMENT | Facility: CLINIC | Age: 58
End: 2023-05-19

## 2023-08-09 ENCOUNTER — APPOINTMENT (OUTPATIENT)
Dept: PAIN MANAGEMENT | Facility: CLINIC | Age: 58
End: 2023-08-09
Payer: MEDICARE

## 2023-08-09 VITALS
WEIGHT: 160 LBS | HEIGHT: 70 IN | BODY MASS INDEX: 22.9 KG/M2 | DIASTOLIC BLOOD PRESSURE: 80 MMHG | SYSTOLIC BLOOD PRESSURE: 140 MMHG

## 2023-08-09 PROCEDURE — 99214 OFFICE O/P EST MOD 30 MIN: CPT

## 2023-08-09 NOTE — HISTORY OF PRESENT ILLNESS
[FreeTextEntry1] : Interval Note:\par  \par  sp  right knee steroid injection 4/7/23 with improvement in knee pain.  \par  s/p right thoracic (T10, 11, 12)  radiofreqency ablation on 3/9/23 with significant improvement in pain. Today feels well.\par  \par  Overall doing well.\par  Continues tylenol #3 BID prn\par  Complains of bilateral shoulder and elbow pain at times.  Affecting adls and tender.  \par  \par  \par  HPI\par  \par  Ms. NORMA DOLAN is a 58 year F with pmhx of with history of iodinated contrast allergy with nausea and vomiting (no anaphylaxis), antiphospholipid syndrome, cva 31 years ago with left sided facial droop, left UE and LE weakness on coumadin for >5 years managed by Dr. Suh, hypertension, seizure disorder who sustained a fall in the beginning of 2/2017 and admitted to [facility] for evaluation and treatment of PNA s/p abx therapy upon discharge at [facility] in early 2/2017. As per patient she was at [facility] re[facility] when she fell from her walker about 1 week ago and felt her back twist. Admitted at [facility] 2/19/17 for lower back pain found o have acute L1 and L2 compression fracture. Patient is s/p L1 L2 kyphoplasty by me 2/20/17. Discharged to [facility] subacute rehab where patient is currently. Lower back pain has significantly improved since kyphoplasty. Patient is able to participate in PT. Denies any additional weakness, numbness or fever/chills. Incision is well healed, nonerythematous. Patient does complain of axial/mechanical bilateral lower back pain for many years without any inciting event. Nonradiating, described as aching. Patient takes tramadol 50mg BID for this pain but demonstrates desire to wean secondary to history of seizures.\par         Patient presents with  bilateral lower back pain. The pain has been occurring for many years. The pain frequency is constant with exacerbations. The character of the pain is described as aching. The current pain intensity is 7/10. With activity, the pain intensity increases to  10/10. The pain increases with activity. The pain is decreased by rest, medications. Patient reports that performing activities of daily living difficult. \par     History:  \par  \par  Previous nerve block or injection \par  \par    right knee steroid injection 4/7/23\par  right thoracic (T10, 11, 12)  radiofreqency ablation on 3/9/23\par  \par  repeat RIGHT THORACIC DIAGNOSTIC medial branch block T10, 11, 12 (2 joints, 3 nerves on each side) 12/14/22\par   RIGHT THORACIC DIAGNOSTIC T10, 11, 12 medial branch block (2 joints, 3 nerves on each side)\par  T8 kyphoplasty 3/3/22\par  LEFT AND RIGHT  L3-sacral ala medial branch radiofrequency ablation 10/20/19\par  BILATERAL L3-sacral ala diagnostic medial branch block 9/5/19\par          B/L L3-sacral ryan mbb RFA by me 5/16/17 with 100% relief of lower back pain\par          \par          Bilateral L3-sacral ryan mbb by me 3/16/17 with 90% relief of back pain for 15 hours\par          \par          Bilateral L3-sacral ryan mbb by me 4/13/17 with 50% relief of back pain for 8 hours\par          \par          L1 L2 kyphoplasty by me 2/20/17\par  \par  Imaging studies  \par  \par  MRI TS 8/22\par  \par   There is no new/acute fracture. There is interval kyphoplasty of a previously identified T8\par  compression deformity. Vertebral body heights are otherwise maintained.\par  \par   There is normal thoracic kyphosis. No subluxation. No prevertebral soft tissue swelling.\par  \par   The thoracic cord is normal in size and signal characteristics.\par  \par   There is no significant disc pathology within the thoracic spine. No areas of central canal or\par  neural foraminal narrowing.\par  \par   No significant change in left adrenal adenoma.\par  \par  \par  \par   IMPRESSION:\par  \par   There is no new/acute fracture. There is interval kyphoplasty of a previously identified T8\par  compression deformity. Vertebral body heights are otherwise maintained.\par  \par   No disc herniation, central canal or neural foraminal narrowing.\par  \par  MRI LS 1/22\par  \par  Comparison is made to a prior MRI of the lumbar spine performed on 7/9/2019.\par  \par   There is normal curvature to the lumbar lordosis. There is mild dextroscoliosis of the lower\par  thoracic and upper lumbar spine with apex at L1/L2, unchanged. There are mild chronic compression\par  fractures of the L1, L2 and L3 vertebral bodies. The patient is status post kyphoplasty of L1 and\par  L2. The remaining vertebral bodies are normal height and configuration. The intervertebral disc\par  demonstrate moderate loss of disc height and T2 signal at the L3/L4 and L5/S1 and mild loss at L4/L5\par  disc spaces with anterior and minimal posterior osteophyte formation consistent with desiccation and\par  degenerative changes. The conus terminates at the L1 level and demonstrates no evidence of abnormal\par  signal changes.\par  \par   Evaluation of the individual levels demonstrates at the L5/S1 level there is a mild diffuse disc\par  bulge and right-sided osteophyte mildly compressing the distal right L5 foraminal exiting nerve\par  root. There is a central/left paracentral protrusion flattening the ventral thecal sac and minimally\par  contacting the right descending S1 nerve root and compressing and mildly posteriorly displacing the\par  left descending S1 nerve root, unchanged. There is moderate to severe right and moderate left\par  foraminal narrowing. There is moderate facet and ligamentous hypertrophy. There is left lateral\par  recess narrowing, unchanged. There is no evidence of spinal canal stenosis.\par  \par   At the L4/L5 level there is a minimal disc bulge contacting the ventral thecal sac, unchanged.\par  There is mild bilateral foraminal narrowing. There is mild to moderate facet and ligamentous\par  hypertrophy. There is no evidence of spinal canal stenosis.\par  \par   At the L3/L4 level there is a mild diffuse disc bulge flattening the ventral thecal sac, unchanged.\par  There is minimal right and mild left foraminal narrowing. There is mild facet and ligamentous\par  hypertrophy. There is no evidence of spinal canal stenosis.\par  \par   At the L2/L3 level there is a minimal diffuse disc bulge contacting the ventral thecal sac,\par  unchanged. The bilateral neuroforamen are patent. There is mild facet degenerative changes. There is\par  no evidence of spinal canal stenosis.\par  \par   At the L1/L2 and T12/L1 levels there are no evidence of focal disc herniation or spinal canal\par  stenosis.\par  \par  \par   Impression:\par  \par   Mild chronic compression fractures of the L1, L2 and L3 vertebral bodies, unchanged. Status post\par  post kyphoplasty of L1 and L2, unchanged.\par  \par   Degenerative changes of the lumbar spine was notable at L5/S1\par  \par   L5/S1  mild diffuse disc bulge and right-sided osteophyte mildly compressing the distal right L5\par  foraminal exiting nerve root, unchanged. There is a central/left paracentral protrusion flattening\par  the ventral thecal sac and minimally contacting the right descending S1 nerve root and compressing\par  and mildly posteriorly displacing the left descending S1 nerve root, unchanged. L5/S1 no evidence of\par  spinal canal stenosis.\par  \par  MRI TS 1/22\par  \par   MRI of the thoracic spine was performed utilizing sagittal  T1, axial and sagittal T2-\par  weighted and axial gradient echo images as well as sagittal STIR images.\par  \par   There are no prior studies available for comparison.\par  \par   Findings: There is moderate loss of height of the T8 vertebral body with a small band of STIR\par  signal hyperintensity seen at the superior endplate; findings are consistent with an acute to\par  subacute moderate T8 compression fracture. There is no evidence of retropulsion of fracture\par  fragments.\par  \par   There is kyphosis of the thoracic spine. There is a Schmorl's node seen at the superior endplate of\par  T2 inferior endplate of T8. The remaining vertebral bodies are of normal height and configuration.\par  The intervertebral discs spaces demonstrate mild loss of disc height and T2 signal within the T1/T2,\par  T2/T3, T5/T6, T6/T7, T7/T8 and T8/T9 consistent with desiccation. There is an approximately 0.6 cm\par  focus of T1, T2 and STIR signal marked hypointensity that may represent a small bone island at the\par  superior endplate of T9. Evaluation of the spinal cord demonstrates no evidence of abnormal signal\par  changes.\par  \par   Evaluation of the individual levels demonstrates no evidence of a focal disc herniation or spinal\par  cord compression.\par  \par   There is an approximately 1.2 cm rounded soft tissue nodule along the left adrenal gland; for\par  further evaluation, would recommend CT or MRI utilizing adrenal gland protocol.\par  \par  \par   Impression:\par  \par   Moderate acute to subacute compression fracture of the superior endplate of T8.\par  \par   No evidence of focal disc herniation or spinal cord compression.\par  \par  \par   Approximately 1.2 cm rounded soft tissue nodule along the left adrenal gland; for further\par  evaluation, would recommend CT or MRI utilizing adrenal gland protocol.\par  \par  \par  MRI LS 7/2019\par  \par  There is a mild levoscoliosis of the lower lumbar spine. A levoscoliosis of the visualized thoracic  \par  spine is noted. The lumbar lordosis and alignment is grossly maintained. The marrow signal is   \par  overall within normal limits with no focal marrow replacing lesion identified.  \par   \par   There has been interval kyphoplasty of previously seen L1 and L2 compression fractures. There is   \par  mild loss of height of the vertebral bodies with no significant retropulsion or underlying central   \par  canal stenosis. There is additionally a mild chronic compression fracture of the superior endplate   \par  which has developed in the interval. There is no associated vertebral edema. No acute fracture is   \par  identified. There is mild degenerative endplate edema at L5-S1. Otherwise no abnormal vertebral or   \par  paraspinal edema is appreciated. The visualized paraspinal soft tissues appear grossly unremarkable.  \par   \par   The conus medullaris terminates at L2 and the thecal sac terminates at S2. No abnormal signal is   \par  identified in the visualized spinal cord.  \par   \par   L1-2: Mild disc bulge and mild ligamentous hypertrophy. No significant central canal, subarticular,  \par  or foraminal stenosis. No significant interval change.  \par   \par   L2-3: Mild disc bulge and mild ligamentous hypertrophy. No significant central canal, subarticular,  \par  or foraminal stenosis. Mild increase in disc bulging associated with the compression of superior   \par  endplate of L3. No significant increase in stenosis.  \par   \par   L3-4: Mild disc bulge. Mild facet/ligamentous hypertrophy. No significant central canal or   \par  subarticular stenosis. Mild/moderate left foraminal stenosis and no significant right foraminal   \par  stenosis. No significant interval change.  \par   \par   L4-5: Minimal disc bulge and mild facet/ligamentous hypertrophy. No significant central canal or   \par  subarticular stenosis. Mild bilateral foraminal stenosis. Previously seen central disc protrusion is  \par  no longer appreciated. Otherwise no significant interval change.  \par   \par   L5-S1: Small left paracentral disc extrusion with inferiorly oriented extension superimposed on a   \par  mild disc bulge. No significant central canal stenosis. Moderate left subarticular stenosis with   \par  encroachment upon the left S1 nerve root. No significant right subarticular stenosis. Moderate right  \par  foraminal stenosis and no significant left foraminal stenosis. No significant interval change.  \par   \par   \par   \par   IMPRESSION:  \par   \par   No acute fracture. Chronic compression fractures of L1, L2, and L3, with kyphoplasty cement in L1   \par  and L2. Chronic L3 compression fracture has developed since 2/20/2017.  \par   \par   Lumbar spondylosis. Left paracentral disc herniation at L5-S1 encroaches upon the left S1 nerve   \par  root. No significant central canal stenosis. Foraminal stenosis as above, most pronounced at L5-S1   \par  the right. Overall no significant change in stenosis when compared to 2/20/2017.  \par  \par          MRI LS 2/20/17\par  \par  There is a moderate dextroscoliosis of the lower thoracic and upper lumbar spine with apex at the approximate L1-2 level. The normal lumbar lordosis and alignment is maintained. The marrow signal is overall within normal limits with no focal marrow replacing lesion identified.\par          There is a mild compression deformity of the superior endplate of L1 with no significant retropulsion or underlying central canal stenosis. There is a mild compression deformity of the superior endplate of L2 with minimal retropulsion and no significant underlying central canal stenosis. There is edema associated with both these fractures consistent with acute to subacute fractures. In the visualized sacrum, there is edema at the S2 and S3 vertebral bodies which is nonspecific, possibly reflecting an additional sacral fracture. No definite cortical break was seen in this region on recent CT of the lumbar spine.\par          The visualized paraspinal soft tissues appear grossly unremarkable. The conus medullaris terminates at L2 and the thecal sac terminates at S1-2. No abnormal signal is identified in the visualized spinal cord.\par          T12-L1: There is no significant disc bulge or herniation. There is bilateral facet hypertrophy. There is no significant central canal, subarticular, or foraminal stenosis.\par          L1-2: There is minimal retropulsion of the superior endplate of L2. There is minimal disc bulging. There is moderate bilateral facet/ligamentous hypertrophy. There is no significant central canal, subarticular, or foraminal zone stenosis.\par          L2-3: There is no significant disc bulge or herniation. There is moderate bilateral facet/ligamentous hypertrophy. There is no significant central canal, subarticular, or foraminal zone stenosis.\par          L3-4: There is a moderate diffuse disc bulge. There is moderate bilateral facet/ligamentous hypertrophy. There is no significant central canal or subarticular zone stenosis. There is mild left foraminal stenosis and no significant right foraminal stenosis.\par          L4-5: There is a small central disc protrusion. There is moderate bilateral facet/ligamentous hypertrophy. There is no significant central canal stenosis. There is mild bilateral subarticular zone stenosis. There is mild bilateral foraminal stenosis.\par          L5-S1: There is a left paracentral disc extrusion with mild inferiorly oriented subligamentous extension superimposed on mild diffuse disc bulge. There is moderate bilateral facet hypertrophy. There is no significant central canal stenosis. There is moderate/severe left subarticular zone stenosis with encroachment upon the left S1 nerve root. There is no significant right subarticular zone stenosis. There is moderate right foraminal stenosis and no significant left foraminal stenosis.\par  \par  IMPRESSION:\par          Acute/subacute compression fracture of the superior endplate of L1 with no significant retropulsion or central canal stenosis. Acute/subacute compression fracture of the superior endplate of L2 with minimal retropulsion and no significant underlying central canal stenosis. Edema in the partially imaged S2 on S3 vertebra suggest an additional sacral fracture.\par          At L5-S1, a left paracentral disc herniation encroaches upon the left S1 nerve root.\par          Lumbar scoliosis and spondylosis. No significant central canal stenosis. Foraminal stenosis most pronounced at L5-S1 on the right.\par          \par  \par

## 2023-08-09 NOTE — HISTORY OF PRESENT ILLNESS
[FreeTextEntry1] : Interval Note:\par  \par  sp  right knee steroid injection 4/7/23 with improvement in knee pain.  \par  s/p right thoracic (T10, 11, 12)  radiofreqency ablation on 3/9/23 with significant improvement in pain. Today feels well.\par  \par  Overall doing well.\par  Continues tylenol #3 BID prn\par  Complains of bilateral shoulder and elbow pain at times.  Affecting adls and tender.  \par  \par  \par  HPI\par  \par  Ms. NORMA DOLAN is a 58 year F with pmhx of with history of iodinated contrast allergy with nausea and vomiting (no anaphylaxis), antiphospholipid syndrome, cva 31 years ago with left sided facial droop, left UE and LE weakness on coumadin for >5 years managed by Dr. Suh, hypertension, seizure disorder who sustained a fall in the beginning of 2/2017 and admitted to [facility] for evaluation and treatment of PNA s/p abx therapy upon discharge at [facility] in early 2/2017. As per patient she was at [facility] when she fell from her walker about 1 week ago and felt her back twist. Admitted at [facility] 2/19/17 for lower back pain found o have acute L1 and L2 compression fracture. Patient is s/p L1 L2 kyphoplasty by me 2/20/17. Discharged to [facility] subacute rehab where patient is currently. Lower back pain has significantly improved since kyphoplasty. Patient is able to participate in PT. Denies any additional weakness, numbness or fever/chills. Incision is well healed, nonerythematous. Patient does complain of axial/mechanical bilateral lower back pain for many years without any inciting event. Nonradiating, described as aching. Patient takes tramadol 50mg BID for this pain but demonstrates desire to wean secondary to history of seizures.\par         Patient presents with  bilateral lower back pain. The pain has been occurring for many years. The pain frequency is constant with exacerbations. The character of the pain is described as aching. The current pain intensity is 7/10. With activity, the pain intensity increases to  10/10. The pain increases with activity. The pain is decreased by rest, medications. Patient reports that performing activities of daily living difficult. \par     History:  \par  \par  Previous nerve block or injection \par  \par    right knee steroid injection 4/7/23\par  right thoracic (T10, 11, 12)  radiofreqency ablation on 3/9/23\par  \par  repeat RIGHT THORACIC DIAGNOSTIC medial branch block T10, 11, 12 (2 joints, 3 nerves on each side) 12/14/22\par   RIGHT THORACIC DIAGNOSTIC T10, 11, 12 medial branch block (2 joints, 3 nerves on each side)\par  T8 kyphoplasty 3/3/22\par  LEFT AND RIGHT  L3-sacral ala medial branch radiofrequency ablation 10/20/19\par  BILATERAL L3-sacral ala diagnostic medial branch block 9/5/19\par          B/L L3-sacral ryan mbb RFA by me 5/16/17 with 100% relief of lower back pain\par          \par          Bilateral L3-sacral ryna mbb by me 3/16/17 with 90% relief of back pain for 15 hours\par          \par          Bilateral L3-sacral ryan mbb by me 4/13/17 with 50% relief of back pain for 8 hours\par          \par          L1 L2 kyphoplasty by me 2/20/17\par  \par  Imaging studies  \par  \par  MRI TS 8/22\par  \par   There is no new/acute fracture. There is interval kyphoplasty of a previously identified T8\par  compression deformity. Vertebral body heights are otherwise maintained.\par  \par   There is normal thoracic kyphosis. No subluxation. No prevertebral soft tissue swelling.\par  \par   The thoracic cord is normal in size and signal characteristics.\par  \par   There is no significant disc pathology within the thoracic spine. No areas of central canal or\par  neural foraminal narrowing.\par  \par   No significant change in left adrenal adenoma.\par  \par  \par  \par   IMPRESSION:\par  \par   There is no new/acute fracture. There is interval kyphoplasty of a previously identified T8\par  compression deformity. Vertebral body heights are otherwise maintained.\par  \par   No disc herniation, central canal or neural foraminal narrowing.\par  \par  MRI LS 1/22\par  \par  Comparison is made to a prior MRI of the lumbar spine performed on 7/9/2019.\par  \par   There is normal curvature to the lumbar lordosis. There is mild dextroscoliosis of the lower\par  thoracic and upper lumbar spine with apex at L1/L2, unchanged. There are mild chronic compression\par  fractures of the L1, L2 and L3 vertebral bodies. The patient is status post kyphoplasty of L1 and\par  L2. The remaining vertebral bodies are normal height and configuration. The intervertebral disc\par  demonstrate moderate loss of disc height and T2 signal at the L3/L4 and L5/S1 and mild loss at L4/L5\par  disc spaces with anterior and minimal posterior osteophyte formation consistent with desiccation and\par  degenerative changes. The conus terminates at the L1 level and demonstrates no evidence of abnormal\par  signal changes.\par  \par   Evaluation of the individual levels demonstrates at the L5/S1 level there is a mild diffuse disc\par  bulge and right-sided osteophyte mildly compressing the distal right L5 foraminal exiting nerve\par  root. There is a central/left paracentral protrusion flattening the ventral thecal sac and minimally\par  contacting the right descending S1 nerve root and compressing and mildly posteriorly displacing the\par  left descending S1 nerve root, unchanged. There is moderate to severe right and moderate left\par  foraminal narrowing. There is moderate facet and ligamentous hypertrophy. There is left lateral\par  recess narrowing, unchanged. There is no evidence of spinal canal stenosis.\par  \par   At the L4/L5 level there is a minimal disc bulge contacting the ventral thecal sac, unchanged.\par  There is mild bilateral foraminal narrowing. There is mild to moderate facet and ligamentous\par  hypertrophy. There is no evidence of spinal canal stenosis.\par  \par   At the L3/L4 level there is a mild diffuse disc bulge flattening the ventral thecal sac, unchanged.\par  There is minimal right and mild left foraminal narrowing. There is mild facet and ligamentous\par  hypertrophy. There is no evidence of spinal canal stenosis.\par  \par   At the L2/L3 level there is a minimal diffuse disc bulge contacting the ventral thecal sac,\par  unchanged. The bilateral neuroforamen are patent. There is mild facet degenerative changes. There is\par  no evidence of spinal canal stenosis.\par  \par   At the L1/L2 and T12/L1 levels there are no evidence of focal disc herniation or spinal canal\par  stenosis.\par  \par  \par   Impression:\par  \par   Mild chronic compression fractures of the L1, L2 and L3 vertebral bodies, unchanged. Status post\par  post kyphoplasty of L1 and L2, unchanged.\par  \par   Degenerative changes of the lumbar spine was notable at L5/S1\par  \par   L5/S1  mild diffuse disc bulge and right-sided osteophyte mildly compressing the distal right L5\par  foraminal exiting nerve root, unchanged. There is a central/left paracentral protrusion flattening\par  the ventral thecal sac and minimally contacting the right descending S1 nerve root and compressing\par  and mildly posteriorly displacing the left descending S1 nerve root, unchanged. L5/S1 no evidence of\par  spinal canal stenosis.\par  \par  MRI TS 1/22\par  \par   MRI of the thoracic spine was performed utilizing sagittal  T1, axial and sagittal T2-\par  weighted and axial gradient echo images as well as sagittal STIR images.\par  \par   There are no prior studies available for comparison.\par  \par   Findings: There is moderate loss of height of the T8 vertebral body with a small band of STIR\par  signal hyperintensity seen at the superior endplate; findings are consistent with an acute to\par  subacute moderate T8 compression fracture. There is no evidence of retropulsion of fracture\par  fragments.\par  \par   There is kyphosis of the thoracic spine. There is a Schmorl's node seen at the superior endplate of\par  T2 inferior endplate of T8. The remaining vertebral bodies are of normal height and configuration.\par  The intervertebral discs spaces demonstrate mild loss of disc height and T2 signal within the T1/T2,\par  T2/T3, T5/T6, T6/T7, T7/T8 and T8/T9 consistent with desiccation. There is an approximately 0.6 cm\par  focus of T1, T2 and STIR signal marked hypointensity that may represent a small bone island at the\par  superior endplate of T9. Evaluation of the spinal cord demonstrates no evidence of abnormal signal\par  changes.\par  \par   Evaluation of the individual levels demonstrates no evidence of a focal disc herniation or spinal\par  cord compression.\par  \par   There is an approximately 1.2 cm rounded soft tissue nodule along the left adrenal gland; for\par  further evaluation, would recommend CT or MRI utilizing adrenal gland protocol.\par  \par  \par   Impression:\par  \par   Moderate acute to subacute compression fracture of the superior endplate of T8.\par  \par   No evidence of focal disc herniation or spinal cord compression.\par  \par  \par   Approximately 1.2 cm rounded soft tissue nodule along the left adrenal gland; for further\par  evaluation, would recommend CT or MRI utilizing adrenal gland protocol.\par  \par  \par  MRI LS 7/2019\par  \par  There is a mild levoscoliosis of the lower lumbar spine. A levoscoliosis of the visualized thoracic  \par  spine is noted. The lumbar lordosis and alignment is grossly maintained. The marrow signal is   \par  overall within normal limits with no focal marrow replacing lesion identified.  \par   \par   There has been interval kyphoplasty of previously seen L1 and L2 compression fractures. There is   \par  mild loss of height of the vertebral bodies with no significant retropulsion or underlying central   \par  canal stenosis. There is additionally a mild chronic compression fracture of the superior endplate   \par  which has developed in the interval. There is no associated vertebral edema. No acute fracture is   \par  identified. There is mild degenerative endplate edema at L5-S1. Otherwise no abnormal vertebral or   \par  paraspinal edema is appreciated. The visualized paraspinal soft tissues appear grossly unremarkable.  \par   \par   The conus medullaris terminates at L2 and the thecal sac terminates at S2. No abnormal signal is   \par  identified in the visualized spinal cord.  \par   \par   L1-2: Mild disc bulge and mild ligamentous hypertrophy. No significant central canal, subarticular,  \par  or foraminal stenosis. No significant interval change.  \par   \par   L2-3: Mild disc bulge and mild ligamentous hypertrophy. No significant central canal, subarticular,  \par  or foraminal stenosis. Mild increase in disc bulging associated with the compression of superior   \par  endplate of L3. No significant increase in stenosis.  \par   \par   L3-4: Mild disc bulge. Mild facet/ligamentous hypertrophy. No significant central canal or   \par  subarticular stenosis. Mild/moderate left foraminal stenosis and no significant right foraminal   \par  stenosis. No significant interval change.  \par   \par   L4-5: Minimal disc bulge and mild facet/ligamentous hypertrophy. No significant central canal or   \par  subarticular stenosis. Mild bilateral foraminal stenosis. Previously seen central disc protrusion is  \par  no longer appreciated. Otherwise no significant interval change.  \par   \par   L5-S1: Small left paracentral disc extrusion with inferiorly oriented extension superimposed on a   \par  mild disc bulge. No significant central canal stenosis. Moderate left subarticular stenosis with   \par  encroachment upon the left S1 nerve root. No significant right subarticular stenosis. Moderate right  \par  foraminal stenosis and no significant left foraminal stenosis. No significant interval change.  \par   \par   \par   \par   IMPRESSION:  \par   \par   No acute fracture. Chronic compression fractures of L1, L2, and L3, with kyphoplasty cement in L1   \par  and L2. Chronic L3 compression fracture has developed since 2/20/2017.  \par   \par   Lumbar spondylosis. Left paracentral disc herniation at L5-S1 encroaches upon the left S1 nerve   \par  root. No significant central canal stenosis. Foraminal stenosis as above, most pronounced at L5-S1   \par  the right. Overall no significant change in stenosis when compared to 2/20/2017.  \par  \par          MRI LS 2/20/17\par  \par  There is a moderate dextroscoliosis of the lower thoracic and upper lumbar spine with apex at the approximate L1-2 level. The normal lumbar lordosis and alignment is maintained. The marrow signal is overall within normal limits with no focal marrow replacing lesion identified.\par          There is a mild compression deformity of the superior endplate of L1 with no significant retropulsion or underlying central canal stenosis. There is a mild compression deformity of the superior endplate of L2 with minimal retropulsion and no significant underlying central canal stenosis. There is edema associated with both these fractures consistent with acute to subacute fractures. In the visualized sacrum, there is edema at the S2 and S3 vertebral bodies which is nonspecific, possibly reflecting an additional sacral fracture. No definite cortical break was seen in this region on recent CT of the lumbar spine.\par          The visualized paraspinal soft tissues appear grossly unremarkable. The conus medullaris terminates at L2 and the thecal sac terminates at S1-2. No abnormal signal is identified in the visualized spinal cord.\par          T12-L1: There is no significant disc bulge or herniation. There is bilateral facet hypertrophy. There is no significant central canal, subarticular, or foraminal stenosis.\par          L1-2: There is minimal retropulsion of the superior endplate of L2. There is minimal disc bulging. There is moderate bilateral facet/ligamentous hypertrophy. There is no significant central canal, subarticular, or foraminal zone stenosis.\par          L2-3: There is no significant disc bulge or herniation. There is moderate bilateral facet/ligamentous hypertrophy. There is no significant central canal, subarticular, or foraminal zone stenosis.\par          L3-4: There is a moderate diffuse disc bulge. There is moderate bilateral facet/ligamentous hypertrophy. There is no significant central canal or subarticular zone stenosis. There is mild left foraminal stenosis and no significant right foraminal stenosis.\par          L4-5: There is a small central disc protrusion. There is moderate bilateral facet/ligamentous hypertrophy. There is no significant central canal stenosis. There is mild bilateral subarticular zone stenosis. There is mild bilateral foraminal stenosis.\par          L5-S1: There is a left paracentral disc extrusion with mild inferiorly oriented subligamentous extension superimposed on mild diffuse disc bulge. There is moderate bilateral facet hypertrophy. There is no significant central canal stenosis. There is moderate/severe left subarticular zone stenosis with encroachment upon the left S1 nerve root. There is no significant right subarticular zone stenosis. There is moderate right foraminal stenosis and no significant left foraminal stenosis.\par  \par  IMPRESSION:\par          Acute/subacute compression fracture of the superior endplate of L1 with no significant retropulsion or central canal stenosis. Acute/subacute compression fracture of the superior endplate of L2 with minimal retropulsion and no significant underlying central canal stenosis. Edema in the partially imaged S2 on S3 vertebra suggest an additional sacral fracture.\par          At L5-S1, a left paracentral disc herniation encroaches upon the left S1 nerve root.\par          Lumbar scoliosis and spondylosis. No significant central canal stenosis. Foraminal stenosis most pronounced at L5-S1 on the right.\par          \par  \par

## 2023-08-24 ENCOUNTER — RESULT REVIEW (OUTPATIENT)
Age: 58
End: 2023-08-24

## 2023-08-24 ENCOUNTER — APPOINTMENT (OUTPATIENT)
Dept: PAIN MANAGEMENT | Facility: CLINIC | Age: 58
End: 2023-08-24
Payer: MEDICARE

## 2023-08-24 PROCEDURE — 99214 OFFICE O/P EST MOD 30 MIN: CPT | Mod: 25

## 2023-08-24 PROCEDURE — 20553 NJX 1/MLT TRIGGER POINTS 3/>: CPT

## 2023-08-24 NOTE — HISTORY OF PRESENT ILLNESS
[FreeTextEntry1] : Interval Note:  Patient reports significant pain over the right thoracic area.  She is very frustrated because she yet to get the imaging.  Her pain is severe and unable to perform adls or rest. denies any worsening numbness, weakness, bowel/bladder dysfunction.  Also complains of significant pain over neck and right arm.  Reports right arm experiences occasional numbness.    HPI  Ms. NORMA DLOAN is a 58 year F with pmhx of with history of iodinated contrast allergy with nausea and vomiting (no anaphylaxis), antiphospholipid syndrome, cva 31 years ago with left sided facial droop, left UE and LE weakness on coumadin for >5 years managed by Dr. Suh, hypertension, seizure disorder who sustained a fall in the beginning of 2/2017 and admitted to [facility] for evaluation and treatment of PNA s/p abx therapy upon discharge at [facility] in early 2/2017. As per patient she was at [facility] when she fell from her walker about 1 week ago and felt her back twist. Admitted at [facility] 2/19/17 for lower back pain found o have acute L1 and L2 compression fracture. Patient is s/p L1 L2 kyphoplasty by me 2/20/17. Discharged to [facility] subacute rehab where patient is currently. Lower back pain has significantly improved since kyphoplasty. Patient is able to participate in PT. Denies any additional weakness, numbness or fever/chills. Incision is well healed, nonerythematous. Patient does complain of axial/mechanical bilateral lower back pain for many years without any inciting event. Nonradiating, described as aching. Patient takes tramadol 50mg BID for this pain but demonstrates desire to wean secondary to history of seizures.        Patient presents with  bilateral lower back pain. The pain has been occurring for many years. The pain frequency is constant with exacerbations. The character of the pain is described as aching. The current pain intensity is 7/10. With activity, the pain intensity increases to  10/10. The pain increases with activity. The pain is decreased by rest, medications. Patient reports that performing activities of daily living difficult.     History:    Previous nerve block or injection  Right thoracic TPI 8/24/23   right knee steroid injection 4/7/23 right thoracic (T10, 11, 12)  radiofreqency ablation on 3/9/23  repeat RIGHT THORACIC DIAGNOSTIC medial branch block T10, 11, 12 (2 joints, 3 nerves on each side) 12/14/22  RIGHT THORACIC DIAGNOSTIC T10, 11, 12 medial branch block (2 joints, 3 nerves on each side) T8 kyphoplasty 3/3/22 LEFT AND RIGHT  L3-sacral ala medial branch radiofrequency ablation 10/20/19 BILATERAL L3-sacral ala diagnostic medial branch block 9/5/19         B/L L3-sacral ryan mbb RFA by me 5/16/17 with 100% relief of lower back pain                  Bilateral L3-sacral ryan mbb by me 3/16/17 with 90% relief of back pain for 15 hours                  Bilateral L3-sacral ryan mbb by me 4/13/17 with 50% relief of back pain for 8 hours                  L1 L2 kyphoplasty by me 2/20/17  Imaging studies    CT [facility] 7/23  CT examination of thoracic spine without contrast was performed. Multiple axial images obtained from C7-T1 through T12-L1 with sagittal/coronal reformatted images. Images reviewed in soft tissue and bone windows.  CT examination of lumbar spine without contrast was performed. Sagittal/coronal reformatted images were acquired. Images reviewed in soft tissue and bone windows.  COMPARISON: August 26, 2022 MRI thoracic spine. January 12, 2022 MRI lumbar spine   FINDINGS:  Partially imaged left hip arthroplasty on  radiograph.    Thoracic spine:  Redemonstration of mild to moderate chronic compression fracture deformity with vertebral body augmentation of T8  There is mild levocurvature centered at the mid to lower thoracic spine.. Thoracic vertebral body heights are otherwise appear to be maintained. There are multilevel degenerative endplate changes with endplate sclerosis, intervertebral disc space narrowing and marginal osteophytes. Few prominent disc bulges throughout thoracic spine without significant canal or foraminal stenosis. No acute thoracic spine fracture identified.  There is no significant foraminal canal stenosis at any level within the thoracic spine.  Visualized lung apices are unremarkable. Visualized paraspinal soft tissues are unremarkable.     Lumbar spine:   For the purposes of this report, 5 nonrib-bearing lumbar-type vertebral bodies are present in the inferior most well-formed intervertebral disc space will be designated as L5-S1. Vertebral body denoted as L5 is at the level of the iliolumbar ligament. No CT evidence for transitional lumbosacral anatomy.  Redemonstration of mild to moderate chronic compression fracture deformity with vertebral body augmentation of L1, L2    .Lumbar vertebral body heights otherwise appear to be maintained. Lumbar lordosis is maintained. No significant spondylolisthesis. There are multilevel degenerative endplate changes with osteophyte formation, intervertebral disc space narrowing/desiccation, Schmorl's nodes and endplate irregularity. . No acute fracture identified.    Multilevel findings as follows:  T12-L1: No significant canal or foraminal stenosis.  L1-L2: No significant canal or foraminal stenosis.  L2-L3: No significant canal foraminal stenosis. L3-L4: No significant canal foraminal stenosis.  L4-L5: Disc bulge, bilateral facet arthropathy, ligamentous hypertrophy contributing to no significant canal stenosis and mild bilateral foraminal stenosis.  L5-S1: Disc bulge, bilateral facet arthropathy, ligamentous hypertrophy contributing to no significant canal stenosis and mild to moderate bilateral foraminal stenosis.   IMPRESSION:  Chronic compression fracture deformities of T8, L1 and L2 with evidence of vertebral body on augmentation   No acute fracture identified in the thoracic and lumbar spine.  Multilevel cervical and lumbar spondylitic changes as above.  MRI TS 8/22   There is no new/acute fracture. There is interval kyphoplasty of a previously identified T8 compression deformity. Vertebral body heights are otherwise maintained.   There is normal thoracic kyphosis. No subluxation. No prevertebral soft tissue swelling.   The thoracic cord is normal in size and signal characteristics.   There is no significant disc pathology within the thoracic spine. No areas of central canal or neural foraminal narrowing.   No significant change in left adrenal adenoma.     IMPRESSION:   There is no new/acute fracture. There is interval kyphoplasty of a previously identified T8 compression deformity. Vertebral body heights are otherwise maintained.   No disc herniation, central canal or neural foraminal narrowing.  MRI LS 1/22  Comparison is made to a prior MRI of the lumbar spine performed on 7/9/2019.   There is normal curvature to the lumbar lordosis. There is mild dextroscoliosis of the lower thoracic and upper lumbar spine with apex at L1/L2, unchanged. There are mild chronic compression fractures of the L1, L2 and L3 vertebral bodies. The patient is status post kyphoplasty of L1 and L2. The remaining vertebral bodies are normal height and configuration. The intervertebral disc demonstrate moderate loss of disc height and T2 signal at the L3/L4 and L5/S1 and mild loss at L4/L5 disc spaces with anterior and minimal posterior osteophyte formation consistent with desiccation and degenerative changes. The conus terminates at the L1 level and demonstrates no evidence of abnormal signal changes.   Evaluation of the individual levels demonstrates at the L5/S1 level there is a mild diffuse disc bulge and right-sided osteophyte mildly compressing the distal right L5 foraminal exiting nerve root. There is a central/left paracentral protrusion flattening the ventral thecal sac and minimally contacting the right descending S1 nerve root and compressing and mildly posteriorly displacing the left descending S1 nerve root, unchanged. There is moderate to severe right and moderate left foraminal narrowing. There is moderate facet and ligamentous hypertrophy. There is left lateral recess narrowing, unchanged. There is no evidence of spinal canal stenosis.   At the L4/L5 level there is a minimal disc bulge contacting the ventral thecal sac, unchanged. There is mild bilateral foraminal narrowing. There is mild to moderate facet and ligamentous hypertrophy. There is no evidence of spinal canal stenosis.   At the L3/L4 level there is a mild diffuse disc bulge flattening the ventral thecal sac, unchanged. There is minimal right and mild left foraminal narrowing. There is mild facet and ligamentous hypertrophy. There is no evidence of spinal canal stenosis.   At the L2/L3 level there is a minimal diffuse disc bulge contacting the ventral thecal sac, unchanged. The bilateral neuroforamen are patent. There is mild facet degenerative changes. There is no evidence of spinal canal stenosis.   At the L1/L2 and T12/L1 levels there are no evidence of focal disc herniation or spinal canal stenosis.    Impression:   Mild chronic compression fractures of the L1, L2 and L3 vertebral bodies, unchanged. Status post post kyphoplasty of L1 and L2, unchanged.   Degenerative changes of the lumbar spine was notable at L5/S1   L5/S1  mild diffuse disc bulge and right-sided osteophyte mildly compressing the distal right L5 foraminal exiting nerve root, unchanged. There is a central/left paracentral protrusion flattening the ventral thecal sac and minimally contacting the right descending S1 nerve root and compressing and mildly posteriorly displacing the left descending S1 nerve root, unchanged. L5/S1 no evidence of spinal canal stenosis.  MRI TS 1/22   MRI of the thoracic spine was performed utilizing sagittal  T1, axial and sagittal T2- weighted and axial gradient echo images as well as sagittal STIR images.   There are no prior studies available for comparison.   Findings: There is moderate loss of height of the T8 vertebral body with a small band of STIR signal hyperintensity seen at the superior endplate; findings are consistent with an acute to subacute moderate T8 compression fracture. There is no evidence of retropulsion of fracture fragments.   There is kyphosis of the thoracic spine. There is a Schmorl's node seen at the superior endplate of T2 inferior endplate of T8. The remaining vertebral bodies are of normal height and configuration. The intervertebral discs spaces demonstrate mild loss of disc height and T2 signal within the T1/T2, T2/T3, T5/T6, T6/T7, T7/T8 and T8/T9 consistent with desiccation. There is an approximately 0.6 cm focus of T1, T2 and STIR signal marked hypointensity that may represent a small bone island at the superior endplate of T9. Evaluation of the spinal cord demonstrates no evidence of abnormal signal changes.   Evaluation of the individual levels demonstrates no evidence of a focal disc herniation or spinal cord compression.   There is an approximately 1.2 cm rounded soft tissue nodule along the left adrenal gland; for further evaluation, would recommend CT or MRI utilizing adrenal gland protocol.    Impression:   Moderate acute to subacute compression fracture of the superior endplate of T8.   No evidence of focal disc herniation or spinal cord compression.    Approximately 1.2 cm rounded soft tissue nodule along the left adrenal gland; for further evaluation, would recommend CT or MRI utilizing adrenal gland protocol.   MRI LS 7/2019  There is a mild levoscoliosis of the lower lumbar spine. A levoscoliosis of the visualized thoracic   spine is noted. The lumbar lordosis and alignment is grossly maintained. The marrow signal is    overall within normal limits with no focal marrow replacing lesion identified.      There has been interval kyphoplasty of previously seen L1 and L2 compression fractures. There is    mild loss of height of the vertebral bodies with no significant retropulsion or underlying central    canal stenosis. There is additionally a mild chronic compression fracture of the superior endplate    which has developed in the interval. There is no associated vertebral edema. No acute fracture is    identified. There is mild degenerative endplate edema at L5-S1. Otherwise no abnormal vertebral or    paraspinal edema is appreciated. The visualized paraspinal soft tissues appear grossly unremarkable.      The conus medullaris terminates at L2 and the thecal sac terminates at S2. No abnormal signal is    identified in the visualized spinal cord.      L1-2: Mild disc bulge and mild ligamentous hypertrophy. No significant central canal, subarticular,   or foraminal stenosis. No significant interval change.      L2-3: Mild disc bulge and mild ligamentous hypertrophy. No significant central canal, subarticular,   or foraminal stenosis. Mild increase in disc bulging associated with the compression of superior    endplate of L3. No significant increase in stenosis.      L3-4: Mild disc bulge. Mild facet/ligamentous hypertrophy. No significant central canal or    subarticular stenosis. Mild/moderate left foraminal stenosis and no significant right foraminal    stenosis. No significant interval change.      L4-5: Minimal disc bulge and mild facet/ligamentous hypertrophy. No significant central canal or    subarticular stenosis. Mild bilateral foraminal stenosis. Previously seen central disc protrusion is   no longer appreciated. Otherwise no significant interval change.      L5-S1: Small left paracentral disc extrusion with inferiorly oriented extension superimposed on a    mild disc bulge. No significant central canal stenosis. Moderate left subarticular stenosis with    encroachment upon the left S1 nerve root. No significant right subarticular stenosis. Moderate right   foraminal stenosis and no significant left foraminal stenosis. No significant interval change.          IMPRESSION:      No acute fracture. Chronic compression fractures of L1, L2, and L3, with kyphoplasty cement in L1    and L2. Chronic L3 compression fracture has developed since 2/20/2017.      Lumbar spondylosis. Left paracentral disc herniation at L5-S1 encroaches upon the left S1 nerve    root. No significant central canal stenosis. Foraminal stenosis as above, most pronounced at L5-S1    the right. Overall no significant change in stenosis when compared to 2/20/2017.            MRI LS 2/20/17  There is a moderate dextroscoliosis of the lower thoracic and upper lumbar spine with apex at the approximate L1-2 level. The normal lumbar lordosis and alignment is maintained. The marrow signal is overall within normal limits with no focal marrow replacing lesion identified.         There is a mild compression deformity of the superior endplate of L1 with no significant retropulsion or underlying central canal stenosis. There is a mild compression deformity of the superior endplate of L2 with minimal retropulsion and no significant underlying central canal stenosis. There is edema associated with both these fractures consistent with acute to subacute fractures. In the visualized sacrum, there is edema at the S2 and S3 vertebral bodies which is nonspecific, possibly reflecting an additional sacral fracture. No definite cortical break was seen in this region on recent CT of the lumbar spine.         The visualized paraspinal soft tissues appear grossly unremarkable. The conus medullaris terminates at L2 and the thecal sac terminates at S1-2. No abnormal signal is identified in the visualized spinal cord.         T12-L1: There is no significant disc bulge or herniation. There is bilateral facet hypertrophy. There is no significant central canal, subarticular, or foraminal stenosis.         L1-2: There is minimal retropulsion of the superior endplate of L2. There is minimal disc bulging. There is moderate bilateral facet/ligamentous hypertrophy. There is no significant central canal, subarticular, or foraminal zone stenosis.         L2-3: There is no significant disc bulge or herniation. There is moderate bilateral facet/ligamentous hypertrophy. There is no significant central canal, subarticular, or foraminal zone stenosis.         L3-4: There is a moderate diffuse disc bulge. There is moderate bilateral facet/ligamentous hypertrophy. There is no significant central canal or subarticular zone stenosis. There is mild left foraminal stenosis and no significant right foraminal stenosis.         L4-5: There is a small central disc protrusion. There is moderate bilateral facet/ligamentous hypertrophy. There is no significant central canal stenosis. There is mild bilateral subarticular zone stenosis. There is mild bilateral foraminal stenosis.         L5-S1: There is a left paracentral disc extrusion with mild inferiorly oriented subligamentous extension superimposed on mild diffuse disc bulge. There is moderate bilateral facet hypertrophy. There is no significant central canal stenosis. There is moderate/severe left subarticular zone stenosis with encroachment upon the left S1 nerve root. There is no significant right subarticular zone stenosis. There is moderate right foraminal stenosis and no significant left foraminal stenosis.  IMPRESSION:         Acute/subacute compression fracture of the superior endplate of L1 with no significant retropulsion or central canal stenosis. Acute/subacute compression fracture of the superior endplate of L2 with minimal retropulsion and no significant underlying central canal stenosis. Edema in the partially imaged S2 on S3 vertebra suggest an additional sacral fracture.         At L5-S1, a left paracentral disc herniation encroaches upon the left S1 nerve root.         Lumbar scoliosis and spondylosis. No significant central canal stenosis. Foraminal stenosis most pronounced at L5-S1 on the right.

## 2023-08-24 NOTE — ASSESSMENT
[FreeTextEntry1] : >> Imaging and Other Studies  I personally reviewed the relevant imaging.  Discussed and explained to patient the likely source of pathology and pain.  Questions answered. Xr  will obtain MRI TS to evaluate of acute pathology, given hx of multiple VCF (which may not have been demonstrated on CT)  Neck and arm pain likely secondary to cervical radiculopathy and discogenic pain vs spondylosis refractory to conservative treatments including 6 consecutive weeks of PT/home exercises, will obtain MRI CS to evaluate for pathology  may consider PT vs intervention pending eval  >> Therapy and Other Modalities  hold PT  >> Medications   trial voltaren gel q4 prn pain  Regarding opiate medication to manage pain. I had a detailed discussion with the patient regarding the risks of long-term opioid use, including the potential for medication side effects, hyperaglesia, endocrine dysfunction highly recommend transition of tylenol 3 to just tylenol  Severe lower back pain with axial movement.  Pain is refractory to conservative treatment including tylenol and exercises.  Patient unable to move because of the pain and unable to perform adls.  Because of progressive deterioration secondary to severe pain from vertebral compression fracture with edema demonstrated on imaging, sp T8 vertebral kyphoplasty   Compression fracture of lumbar vertebra, non-traumatic, sequela  compression fracture of L1 and L2 s/p kyphoplasty with significant improvement in pain and function.  now with chronic L3 vcf   gabapentin 100mg nightly acetaminophen 650mg q8h prn pain (caution <3g daily)   >> Interventions  Significant component of axial back pain secondary to lumbar spondylosis and facet arthropathy demonstrated on MRI LS.  Pain refractory to conservative treatments.  s/p BILATERAL L3-sacral ala diagnostic medial branch block with significant improvement  Given significant relief from diagnostic lumbar medial branch block of >80%, and continued lumbar facet arthropathy pain and axial back pain, s/p LEFT AND RIGHT  L3-sacral ala medial branch radiofrequency ablation with improvement in axial back pain  Significant component of axial mid back pain secondary to thoracic spondylosis and facet arthropathy demonstrated on MRI LS.  Pain refractory to conservative treatments. sp  RIGHT THORACIC DIAGNOSTIC medial branch block T10, 11, 12 (2 joints, 3 nerves on each side) with significant improvement for 2 weeks  given efficacy of previous intervention that is >80% improvement in pain and improved ability to perform adls, and return of pain despite conservative treatment, sp repeat RIGHT THORACIC DIAGNOSTIC medial branch block T10, 11, 12 (2 joints, 3 nerves on each side) with 100% improvement  Given significant relief from diagnostic lumbar medial branch block of >80%, and significant improvement in function (as measured by KINA) and continued lumbar facet arthropathy pain (demonstrated on imaging) and axial back pain, sp RIGHT THORACIC DIAGNOSTIC medial branch block T10, 11, 12 (2 joints, 3 nerves on each side) radiofrequency ablation at 80C for 2:30 min   Significant right parathoracic muscular myofascial pain, will perform RIGHT parathoracic trigger point injections r/b/a discussed   >> Consults  fu care with endocrinologist for osteoporosis  >> Discussion of Risks/Benefits/Alternatives  	>Regarding any scheduled procedures:  I have discussed in detail with the patient that any interventional pain procedure is associated with potential risks.  The procedure may include an injection of steroids and potentially other medications (local anesthetic and normal saline) into the epidural space or surrounding tissue of the spine.  There are significant risks of this procedure which include and are not limited to infection, bleeding, worsening pain, dural puncture leading to postdural puncture headache, nerve damage, spinal cord injury, paralysis, stroke, and death.    There is a chance that the procedure does not improve their pain.    There are risks associated with the steroid being absorbed into the body systemically.  These include dysphoria, difficulty sleeping, mood swings and personality changes.  Premenopausal women may notice an irregularity in her menstrual cycle for 2-3 months following the injection.  Steroids can specifically affect patients with hypertension, diabetes, and peptic ulcers.  The procedure may cause a temporary increase in blood pressure and blood pressure, and may adversely affect a peptic ulcer.  Other, more rare complications, include avascular necrosis of joints, glaucoma and worsening of osteoporosis.   I have discussed the risks of the procedure at length with the patient, and the potential benefits of pain relief.  I have offered alternatives to the procedure.  All questions were answered.    The patient expressed understanding and wishes to proceed with the procedure.  	>Regarding COVID19 Pandemic:   Any planned interventional pain procedure are scheduled because further delay may cause harm or negative outcome to patient.  The goal in performing this procedure is to avoid deterioration of function, emergency room visits (which increases exposure) and reliance on opioids.    r/b/a discussed with patient, lack of evidence to conclusively determine whether pain management procedures have any positive or negative impact on the possibility of tracey the virus and/or development of any sequelae.   Patient counselled regarding timing steroid based intervention 2 weeks before or after COVID-19 vaccine administration to avoid any interaction or affect on efficacy of vaccination  Patient demonstrates understanding  Informed patient that risks associated with the COVID-19 infection.  Informed patient steps taken to limit the risks.  We are implementing safety precautions and following protocols consistent with the CDC and state recommendations. All patients and staff will be checked for fever or signs of illness upon entry to the facility. We will limit our steroid dose to the lowest effective therapeutic dose or in some cases steroids will not be injected at all.   Patient agrees to proceed  >> Conclusion  The above diagnosis and treatment plan is medically reasonable and necessary based on the patient encounter  There were no barriers to communication. Informed patient that I would be available for any additional questions. Patient was instructed to call with any worsening symptoms including severe pain, new numbness/weakness, or changes in the bowel/bladder function.  Discussed role of nsaids in pain management and all relevant risks, if patient is continuing to require after 4 weeks the patient should f/u for alternative treatment.  Instructed patient to maintain pain diary to monitor pain level, mobility, and function.   PROCEDURE NOTE  Trigger point injection Procedure Note  Consent was obtained of a fully coherent patient. In office procedure determined by the Diagnosis and clinical condition of the patient. At this point the patient has FAILED all conservative measures to improve and will require an injection in order to help them alleviate their pain. Pt is here for a TRIGGER point injection to areas that are painful due to a POSITIVE point tenderness at the 3 muscle groups involved (including Trapezius, paraspinous muscles). After prep with chrlohexadine, The injection at least point with 1cc bupivacaine 0.25% and 1mg kenalog resulting in relief of pain.  Patient tolerated the procedure well.    The patient remained calm and alert throughout the procedure, no sequelae developed and immediate pain relief was experienced by the patient. Immediate pain relief was experienced by the patient. The area was cleaned and a band aid was applied. Patient given wound care instructions and encouraged to apply ice to area today.

## 2023-09-27 ENCOUNTER — APPOINTMENT (OUTPATIENT)
Dept: PAIN MANAGEMENT | Facility: CLINIC | Age: 58
End: 2023-09-27
Payer: MEDICARE

## 2023-09-27 VITALS
HEART RATE: 83 BPM | BODY MASS INDEX: 22.9 KG/M2 | SYSTOLIC BLOOD PRESSURE: 122 MMHG | OXYGEN SATURATION: 96 % | WEIGHT: 160 LBS | HEIGHT: 70 IN | DIASTOLIC BLOOD PRESSURE: 82 MMHG

## 2023-09-27 DIAGNOSIS — M80.08XS AGE-RELATED OSTEOPOROSIS WITH CURRENT PATHOLOGICAL FRACTURE, VERTEBRA(E), SEQUELA: ICD-10-CM

## 2023-09-27 PROCEDURE — 99214 OFFICE O/P EST MOD 30 MIN: CPT

## 2023-10-27 ENCOUNTER — APPOINTMENT (OUTPATIENT)
Dept: PAIN MANAGEMENT | Facility: CLINIC | Age: 58
End: 2023-10-27
Payer: MEDICARE

## 2023-10-27 VITALS
WEIGHT: 160 LBS | DIASTOLIC BLOOD PRESSURE: 69 MMHG | HEIGHT: 70 IN | BODY MASS INDEX: 22.9 KG/M2 | SYSTOLIC BLOOD PRESSURE: 107 MMHG

## 2023-10-27 DIAGNOSIS — M47.814 SPONDYLOSIS W/OUT MYELOPATHY OR RADICULOPATHY, THORACIC REGION: ICD-10-CM

## 2023-10-27 DIAGNOSIS — M79.18 MYALGIA, OTHER SITE: ICD-10-CM

## 2023-10-27 DIAGNOSIS — M48.061 SPINAL STENOSIS, LUMBAR REGION WITHOUT NEUROGENIC CLAUDICATION: ICD-10-CM

## 2023-10-27 DIAGNOSIS — M54.12 RADICULOPATHY, CERVICAL REGION: ICD-10-CM

## 2023-10-27 DIAGNOSIS — M47.817 SPONDYLOSIS W/OUT MYELOPATHY OR RADICULOPATHY, LUMBOSACRAL REGION: ICD-10-CM

## 2023-10-27 DIAGNOSIS — M17.11 UNILATERAL PRIMARY OSTEOARTHRITIS, RIGHT KNEE: ICD-10-CM

## 2023-10-27 DIAGNOSIS — G89.4 CHRONIC PAIN SYNDROME: ICD-10-CM

## 2023-10-27 DIAGNOSIS — M47.24 OTHER SPONDYLOSIS WITH RADICULOPATHY, THORACIC REGION: ICD-10-CM

## 2023-10-27 PROCEDURE — 99214 OFFICE O/P EST MOD 30 MIN: CPT

## 2023-10-27 RX ORDER — PHENOL 1.4 %
600 AEROSOL, SPRAY (ML) MUCOUS MEMBRANE
Refills: 0 | Status: ACTIVE | COMMUNITY

## 2023-10-27 RX ORDER — EUCALYPTUS/PEPPERMINT OIL
SOLUTION, NON-ORAL NASAL
Refills: 0 | Status: ACTIVE | COMMUNITY

## 2023-10-27 RX ORDER — FLUTICASONE PROPIONATE AND SALMETEROL 250; 50 UG/1; UG/1
250-50 POWDER RESPIRATORY (INHALATION)
Refills: 0 | Status: ACTIVE | COMMUNITY

## 2023-10-27 RX ORDER — ONDANSETRON 8 MG/1
8 TABLET, ORALLY DISINTEGRATING ORAL
Refills: 0 | Status: DISCONTINUED | COMMUNITY
End: 2023-10-27

## 2023-10-27 RX ORDER — TOLTERODINE TARTRATE 4 MG/1
4 CAPSULE, EXTENDED RELEASE ORAL
Refills: 0 | Status: DISCONTINUED | COMMUNITY
End: 2023-10-27

## 2023-10-27 RX ORDER — CARBOXYMETHYLCELLULOSE SODIUM 5 MG/ML
0.5 SOLUTION/ DROPS OPHTHALMIC
Refills: 0 | Status: ACTIVE | COMMUNITY

## 2023-10-27 RX ORDER — CELECOXIB 200 MG/1
200 CAPSULE ORAL
Refills: 0 | Status: DISCONTINUED | COMMUNITY
End: 2023-10-27

## 2023-10-27 RX ORDER — PANTOPRAZOLE SODIUM 40 MG/1
40 GRANULE, DELAYED RELEASE ORAL
Refills: 0 | Status: ACTIVE | COMMUNITY

## 2023-10-27 RX ORDER — LOPERAMIDE HYDROCHLORIDE 2 MG/1
2 CAPSULE ORAL
Refills: 0 | Status: ACTIVE | COMMUNITY

## 2023-10-27 RX ORDER — METOPROLOL SUCCINATE 25 MG/1
25 TABLET, EXTENDED RELEASE ORAL
Refills: 0 | Status: DISCONTINUED | COMMUNITY
End: 2023-10-27

## 2023-10-27 RX ORDER — FENOFIBRATE 145 MG/1
145 TABLET ORAL
Refills: 0 | Status: DISCONTINUED | COMMUNITY
End: 2023-10-27

## 2023-10-27 RX ORDER — ACETAMINOPHEN 325 MG/1
325 TABLET ORAL
Refills: 0 | Status: ACTIVE | COMMUNITY

## 2023-10-27 RX ORDER — MIRTAZAPINE 15 MG/1
15 TABLET, ORALLY DISINTEGRATING ORAL
Refills: 0 | Status: DISCONTINUED | COMMUNITY
End: 2023-10-27

## 2023-10-27 RX ORDER — VENLAFAXINE 75 MG/1
75 TABLET ORAL
Refills: 0 | Status: ACTIVE | COMMUNITY

## 2023-10-27 RX ORDER — PENCICLOVIR 10 MG/G
1 CREAM TOPICAL
Refills: 0 | Status: DISCONTINUED | COMMUNITY
End: 2023-10-27

## 2023-10-27 RX ORDER — ALBUTEROL SULFATE 90 UG/1
108 (90 BASE) INHALANT RESPIRATORY (INHALATION)
Refills: 0 | Status: DISCONTINUED | COMMUNITY
End: 2023-10-27

## 2023-10-27 RX ORDER — CYANOCOBALAMIN 1000 UG/ML
1000 INJECTION INTRAMUSCULAR; SUBCUTANEOUS
Refills: 0 | Status: ACTIVE | COMMUNITY

## 2023-10-27 RX ORDER — BACLOFEN 10 MG/1
10 TABLET ORAL
Refills: 0 | Status: DISCONTINUED | COMMUNITY
End: 2023-10-27

## 2023-10-27 RX ORDER — LANSOPRAZOLE 30 MG/1
30 CAPSULE, DELAYED RELEASE ORAL
Refills: 0 | Status: DISCONTINUED | COMMUNITY
End: 2023-10-27

## 2023-10-27 RX ORDER — BUDESONIDE 0.5 MG/2ML
0.5 SUSPENSION RESPIRATORY (INHALATION)
Refills: 0 | Status: ACTIVE | COMMUNITY

## 2023-10-27 RX ORDER — HYDROCHLOROTHIAZIDE 12.5 MG/1
12.5 TABLET ORAL
Refills: 0 | Status: DISCONTINUED | COMMUNITY
End: 2023-10-27

## 2023-10-27 RX ORDER — BISACODYL 10 MG/1
10 SUPPOSITORY RECTAL
Refills: 0 | Status: ACTIVE | COMMUNITY

## 2023-10-27 RX ORDER — MONTELUKAST SODIUM 10 MG/1
10 TABLET, FILM COATED ORAL
Refills: 0 | Status: DISCONTINUED | COMMUNITY
End: 2023-10-27

## 2023-10-27 RX ORDER — FLUTICASONE PROPION/SALMETEROL 100-50 MCG
100-50 BLISTER, WITH INHALATION DEVICE INHALATION
Refills: 0 | Status: DISCONTINUED | COMMUNITY
End: 2023-10-27

## 2023-10-27 RX ORDER — LIDOCAINE 40 MG/G
4 PATCH TOPICAL
Refills: 0 | Status: ACTIVE | COMMUNITY

## 2023-10-27 RX ORDER — FEXOFENADINE HCL 180 MG
180 TABLET ORAL
Refills: 0 | Status: DISCONTINUED | COMMUNITY
End: 2023-10-27

## 2024-05-21 ENCOUNTER — APPOINTMENT (OUTPATIENT)
Dept: PAIN MANAGEMENT | Facility: CLINIC | Age: 59
End: 2024-05-21